# Patient Record
Sex: FEMALE | Race: WHITE | NOT HISPANIC OR LATINO
[De-identification: names, ages, dates, MRNs, and addresses within clinical notes are randomized per-mention and may not be internally consistent; named-entity substitution may affect disease eponyms.]

---

## 2018-08-23 ENCOUNTER — RESULT REVIEW (OUTPATIENT)
Age: 63
End: 2018-08-23

## 2018-12-31 ENCOUNTER — APPOINTMENT (OUTPATIENT)
Dept: FAMILY MEDICINE | Facility: CLINIC | Age: 63
End: 2018-12-31
Payer: COMMERCIAL

## 2018-12-31 DIAGNOSIS — Z23 ENCOUNTER FOR IMMUNIZATION: ICD-10-CM

## 2018-12-31 PROCEDURE — 90715 TDAP VACCINE 7 YRS/> IM: CPT

## 2018-12-31 PROCEDURE — 90471 IMMUNIZATION ADMIN: CPT

## 2019-12-05 ENCOUNTER — TRANSCRIPTION ENCOUNTER (OUTPATIENT)
Age: 64
End: 2019-12-05

## 2020-01-29 ENCOUNTER — RESULT REVIEW (OUTPATIENT)
Age: 65
End: 2020-01-29

## 2021-03-08 ENCOUNTER — RESULT REVIEW (OUTPATIENT)
Age: 66
End: 2021-03-08

## 2022-04-01 ENCOUNTER — RESULT REVIEW (OUTPATIENT)
Age: 67
End: 2022-04-01

## 2022-04-11 ENCOUNTER — APPOINTMENT (OUTPATIENT)
Dept: INTERNAL MEDICINE | Facility: CLINIC | Age: 67
End: 2022-04-11
Payer: COMMERCIAL

## 2022-04-11 VITALS
SYSTOLIC BLOOD PRESSURE: 140 MMHG | HEIGHT: 63 IN | HEART RATE: 109 BPM | BODY MASS INDEX: 29.23 KG/M2 | WEIGHT: 165 LBS | DIASTOLIC BLOOD PRESSURE: 80 MMHG | OXYGEN SATURATION: 99 % | TEMPERATURE: 96.5 F

## 2022-04-11 PROCEDURE — 99386 PREV VISIT NEW AGE 40-64: CPT | Mod: 25

## 2022-04-11 PROCEDURE — 36415 COLL VENOUS BLD VENIPUNCTURE: CPT

## 2022-04-11 NOTE — HISTORY OF PRESENT ILLNESS
[FreeTextEntry1] : Routine physical exam. [de-identified] : 66-year-old female for routine exam. Patient recently had a GYN exam was told of elevated blood pressure of 170 systolic. Patient has no prior history of hypertension feels absolutely well. There is a family history of hypertension. Past medical history essentially negative. Past surgery trigger finger release. Medications none. Social history nonsmoker nondrinker. Her last mammogram was in February. She had a colonoscopy last October. She feels absolutely well. Current blood pressure 150/90 on the left 140/80 on the right. She is 5 feet 3 weight 165. She has lost 4 pounds over the past several days. She is monitoring her diet carefully.

## 2022-04-11 NOTE — PLAN
[FreeTextEntry1] : Patient to call for lab results. Patient is advised to lose 10 pounds and to return in 2 months.

## 2022-04-11 NOTE — ASSESSMENT
[FreeTextEntry1] : Patient with mild hypertension. Patient is advised to try lose 10 pounds. She would like to try without medication at this time. Will follow blood pressure in 2 months. Routine labs have been drawn.

## 2022-04-14 LAB
25(OH)D3 SERPL-MCNC: 41.1 NG/ML
ALBUMIN SERPL ELPH-MCNC: 4.4 G/DL
ALP BLD-CCNC: 87 U/L
ALT SERPL-CCNC: 24 U/L
ANION GAP SERPL CALC-SCNC: 14 MMOL/L
AST SERPL-CCNC: 26 U/L
BASOPHILS # BLD AUTO: 0.05 K/UL
BASOPHILS NFR BLD AUTO: 0.9 %
BILIRUB SERPL-MCNC: 0.2 MG/DL
BUN SERPL-MCNC: 15 MG/DL
CALCIUM SERPL-MCNC: 9.7 MG/DL
CHLORIDE SERPL-SCNC: 107 MMOL/L
CHOLEST SERPL-MCNC: 224 MG/DL
CO2 SERPL-SCNC: 21 MMOL/L
CREAT SERPL-MCNC: 0.96 MG/DL
EGFR: 66 ML/MIN/1.73M2
EOSINOPHIL # BLD AUTO: 0.19 K/UL
EOSINOPHIL NFR BLD AUTO: 3.4 %
ESTIMATED AVERAGE GLUCOSE: 128 MG/DL
GLUCOSE SERPL-MCNC: 115 MG/DL
HBA1C MFR BLD HPLC: 6.1 %
HCT VFR BLD CALC: 42.5 %
HDLC SERPL-MCNC: 56 MG/DL
HGB BLD-MCNC: 13.4 G/DL
IMM GRANULOCYTES NFR BLD AUTO: 0.2 %
LDLC SERPL CALC-MCNC: 152 MG/DL
LYMPHOCYTES # BLD AUTO: 1.43 K/UL
LYMPHOCYTES NFR BLD AUTO: 25.7 %
MAN DIFF?: NORMAL
MCHC RBC-ENTMCNC: 31.5 GM/DL
MCHC RBC-ENTMCNC: 32.1 PG
MCV RBC AUTO: 101.9 FL
MONOCYTES # BLD AUTO: 0.62 K/UL
MONOCYTES NFR BLD AUTO: 11.2 %
NEUTROPHILS # BLD AUTO: 3.26 K/UL
NEUTROPHILS NFR BLD AUTO: 58.6 %
NONHDLC SERPL-MCNC: 168 MG/DL
PLATELET # BLD AUTO: 347 K/UL
POTASSIUM SERPL-SCNC: 4.6 MMOL/L
PROT SERPL-MCNC: 7.5 G/DL
RBC # BLD: 4.17 M/UL
RBC # FLD: 14.7 %
SODIUM SERPL-SCNC: 142 MMOL/L
TRIGL SERPL-MCNC: 81 MG/DL
TSH SERPL-ACNC: 2.58 UIU/ML
WBC # FLD AUTO: 5.56 K/UL

## 2022-07-13 DIAGNOSIS — U07.1 COVID-19: ICD-10-CM

## 2022-07-25 ENCOUNTER — APPOINTMENT (OUTPATIENT)
Dept: INTERNAL MEDICINE | Facility: CLINIC | Age: 67
End: 2022-07-25

## 2022-07-25 VITALS
WEIGHT: 155 LBS | TEMPERATURE: 96.6 F | SYSTOLIC BLOOD PRESSURE: 110 MMHG | OXYGEN SATURATION: 99 % | BODY MASS INDEX: 27.46 KG/M2 | DIASTOLIC BLOOD PRESSURE: 70 MMHG | HEART RATE: 106 BPM | HEIGHT: 63 IN

## 2022-07-25 DIAGNOSIS — I10 ESSENTIAL (PRIMARY) HYPERTENSION: ICD-10-CM

## 2022-07-25 PROCEDURE — 99214 OFFICE O/P EST MOD 30 MIN: CPT | Mod: 25

## 2022-07-25 PROCEDURE — 36415 COLL VENOUS BLD VENIPUNCTURE: CPT

## 2022-07-25 NOTE — HISTORY OF PRESENT ILLNESS
[FreeTextEntry1] : Followup blood pressure and hyperlipidemia. [de-identified] : Patient is now on atorvastatin 10 mg daily. She has lost 10 pounds and now weighs 155. She is not following her blood pressure. She feels well. Current blood pressure 120/75.

## 2022-07-25 NOTE — ASSESSMENT
[FreeTextEntry1] : Patient does not require blood pressure medication. We'll check cholesterol on atorvastatin 10 mg daily for the past 3 months.

## 2022-07-28 LAB
ALBUMIN SERPL ELPH-MCNC: 4.4 G/DL
ALP BLD-CCNC: 85 U/L
ALT SERPL-CCNC: 25 U/L
ANION GAP SERPL CALC-SCNC: 15 MMOL/L
AST SERPL-CCNC: 28 U/L
BILIRUB SERPL-MCNC: 0.3 MG/DL
BUN SERPL-MCNC: 19 MG/DL
CALCIUM SERPL-MCNC: 9.7 MG/DL
CHLORIDE SERPL-SCNC: 106 MMOL/L
CHOLEST SERPL-MCNC: 177 MG/DL
CO2 SERPL-SCNC: 23 MMOL/L
CREAT SERPL-MCNC: 1.04 MG/DL
EGFR: 60 ML/MIN/1.73M2
GLUCOSE SERPL-MCNC: 102 MG/DL
HDLC SERPL-MCNC: 49 MG/DL
LDLC SERPL CALC-MCNC: 106 MG/DL
NONHDLC SERPL-MCNC: 128 MG/DL
POTASSIUM SERPL-SCNC: 4.7 MMOL/L
PROT SERPL-MCNC: 7.4 G/DL
SODIUM SERPL-SCNC: 143 MMOL/L
TRIGL SERPL-MCNC: 112 MG/DL

## 2022-08-05 ENCOUNTER — TRANSCRIPTION ENCOUNTER (OUTPATIENT)
Age: 67
End: 2022-08-05

## 2023-04-11 ENCOUNTER — RX RENEWAL (OUTPATIENT)
Age: 68
End: 2023-04-11

## 2023-05-03 ENCOUNTER — NON-APPOINTMENT (OUTPATIENT)
Age: 68
End: 2023-05-03

## 2023-07-21 ENCOUNTER — APPOINTMENT (OUTPATIENT)
Dept: INTERNAL MEDICINE | Facility: CLINIC | Age: 68
End: 2023-07-21
Payer: MEDICARE

## 2023-07-21 VITALS
SYSTOLIC BLOOD PRESSURE: 128 MMHG | HEART RATE: 84 BPM | TEMPERATURE: 98.1 F | DIASTOLIC BLOOD PRESSURE: 74 MMHG | BODY MASS INDEX: 28 KG/M2 | WEIGHT: 158 LBS | RESPIRATION RATE: 15 BRPM | HEIGHT: 63 IN | OXYGEN SATURATION: 98 %

## 2023-07-21 DIAGNOSIS — Z83.3 FAMILY HISTORY OF DIABETES MELLITUS: ICD-10-CM

## 2023-07-21 DIAGNOSIS — Z82.5 FAMILY HISTORY OF ASTHMA AND OTHER CHRONIC LOWER RESPIRATORY DISEASES: ICD-10-CM

## 2023-07-21 DIAGNOSIS — R71.8 OTHER ABNORMALITY OF RED BLOOD CELLS: ICD-10-CM

## 2023-07-21 DIAGNOSIS — Z85.72 PERSONAL HISTORY OF NON-HODGKIN LYMPHOMAS: ICD-10-CM

## 2023-07-21 DIAGNOSIS — Z00.00 ENCOUNTER FOR GENERAL ADULT MEDICAL EXAMINATION W/OUT ABNORMAL FINDINGS: ICD-10-CM

## 2023-07-21 DIAGNOSIS — E28.319 ASYMPTOMATIC PREMATURE MENOPAUSE: ICD-10-CM

## 2023-07-21 DIAGNOSIS — R73.03 PREDIABETES.: ICD-10-CM

## 2023-07-21 DIAGNOSIS — Z78.0 ASYMPTOMATIC MENOPAUSAL STATE: ICD-10-CM

## 2023-07-21 DIAGNOSIS — E78.5 HYPERLIPIDEMIA, UNSPECIFIED: ICD-10-CM

## 2023-07-21 PROCEDURE — G0439: CPT

## 2023-07-21 PROCEDURE — 36415 COLL VENOUS BLD VENIPUNCTURE: CPT

## 2023-07-21 RX ORDER — NIRMATRELVIR AND RITONAVIR 300-100 MG
20 X 150 MG & KIT ORAL
Qty: 30 | Refills: 0 | Status: COMPLETED | COMMUNITY
Start: 2022-07-13 | End: 2023-07-21

## 2023-07-21 NOTE — PHYSICAL EXAM
[Normal Sclera/Conjunctiva] : normal sclera/conjunctiva [Normal Outer Ear/Nose] : the outer ears and nose were normal in appearance [No Lymphadenopathy] : no lymphadenopathy [Thyroid Normal, No Nodules] : the thyroid was normal and there were no nodules present [No Edema] : there was no peripheral edema [Normal] : affect was normal and insight and judgment were intact

## 2023-07-21 NOTE — HISTORY OF PRESENT ILLNESS
[de-identified] : Patient hx of NHL, HLD presents today to establish care and for  wellness \par Feels well without complaints today\par BP elevated in GYN office\par \par onc: follows with Dr. Sal yearly

## 2023-07-21 NOTE — PLAN
[FreeTextEntry1] : due for bone density\par Will get vaccine records from [facility] - had shingles and pneumonia there\par up to date with mammo, pap and colonoscopy

## 2023-07-21 NOTE — HEALTH RISK ASSESSMENT
[Very Good] : ~his/her~  mood as very good [1 or 2 (0 pts)] : 1 or 2 (0 points) [Never (0 pts)] : Never (0 points) [No falls in past year] : Patient reported no falls in the past year [0] : 2) Feeling down, depressed, or hopeless: Not at all (0) [Patient reported mammogram was normal] : Patient reported mammogram was normal [Patient reported PAP Smear was normal] : Patient reported PAP Smear was normal [Patient reported colonoscopy was normal] : Patient reported colonoscopy was normal [HIV test declined] : HIV test declined [Hepatitis C test declined] : Hepatitis C test declined [With Family] : lives with family [Employed] : employed [Retired] : retired [] :  [Sexually Active] : sexually active [Feels Safe at Home] : Feels safe at home [Fully functional (bathing, dressing, toileting, transferring, walking, feeding)] : Fully functional (bathing, dressing, toileting, transferring, walking, feeding) [Fully functional (using the telephone, shopping, preparing meals, housekeeping, doing laundry, using] : Fully functional and needs no help or supervision to perform IADLs (using the telephone, shopping, preparing meals, housekeeping, doing laundry, using transportation, managing medications and managing finances) [Smoke Detector] : smoke detector [Carbon Monoxide Detector] : carbon monoxide detector [Sunscreen] : uses sunscreen [Never] : Never [Monthly or less (1 pt)] : Monthly or less (1 point) [PHQ-2 Negative - No further assessment needed] : PHQ-2 Negative - No further assessment needed [de-identified] : OCCASIONAL  [[id number]] : 0 [Change in mental status noted] : No change in mental status noted [Language] : denies difficulty with language [Behavior] : denies difficulty with behavior [Learning/Retaining New Information] : denies difficulty learning/retaining new information [Handling Complex Tasks] : denies difficulty handling complex tasks [Reasoning] : denies difficulty with reasoning [Spatial Ability and Orientation] : denies difficulty with spatial ability and orientation [High Risk Behavior] : no high risk behavior [Seat Belt] : does not use seat belt [TB Exposure] : is not being exposed to tuberculosis [MammogramDate] : 03/23 [PapSmearDate] : 05/23 [ColonoscopyDate] : 01/21 [ColonoscopyComments] : NEXT COLON 2031 Dr. Muñiz

## 2023-07-25 ENCOUNTER — TRANSCRIPTION ENCOUNTER (OUTPATIENT)
Age: 68
End: 2023-07-25

## 2023-07-25 LAB
ALBUMIN SERPL ELPH-MCNC: 4.6 G/DL
ALP BLD-CCNC: 86 U/L
ALT SERPL-CCNC: 23 U/L
ANION GAP SERPL CALC-SCNC: 13 MMOL/L
AST SERPL-CCNC: 30 U/L
BILIRUB SERPL-MCNC: 0.3 MG/DL
BUN SERPL-MCNC: 20 MG/DL
CALCIUM SERPL-MCNC: 10.2 MG/DL
CHLORIDE SERPL-SCNC: 101 MMOL/L
CHOLEST SERPL-MCNC: 181 MG/DL
CO2 SERPL-SCNC: 25 MMOL/L
CREAT SERPL-MCNC: 1.01 MG/DL
EGFR: 61 ML/MIN/1.73M2
ESTIMATED AVERAGE GLUCOSE: 120 MG/DL
FOLATE SERPL-MCNC: >20 NG/ML
GLUCOSE SERPL-MCNC: 89 MG/DL
HBA1C MFR BLD HPLC: 5.8 %
HDLC SERPL-MCNC: 59 MG/DL
LDLC SERPL CALC-MCNC: 99 MG/DL
NONHDLC SERPL-MCNC: 122 MG/DL
POTASSIUM SERPL-SCNC: 4.5 MMOL/L
PROT SERPL-MCNC: 7.9 G/DL
SODIUM SERPL-SCNC: 138 MMOL/L
TRIGL SERPL-MCNC: 133 MG/DL
VIT B12 SERPL-MCNC: 950 PG/ML

## 2023-07-28 ENCOUNTER — TRANSCRIPTION ENCOUNTER (OUTPATIENT)
Age: 68
End: 2023-07-28

## 2023-08-09 ENCOUNTER — RESULT REVIEW (OUTPATIENT)
Age: 68
End: 2023-08-09

## 2023-08-19 ENCOUNTER — TRANSCRIPTION ENCOUNTER (OUTPATIENT)
Age: 68
End: 2023-08-19

## 2024-04-15 RX ORDER — ATORVASTATIN CALCIUM 10 MG/1
10 TABLET, FILM COATED ORAL
Qty: 90 | Refills: 1 | Status: ACTIVE | COMMUNITY
Start: 2022-04-14 | End: 1900-01-01

## 2024-06-28 ENCOUNTER — NON-APPOINTMENT (OUTPATIENT)
Age: 69
End: 2024-06-28

## 2024-07-01 ENCOUNTER — NON-APPOINTMENT (OUTPATIENT)
Age: 69
End: 2024-07-01

## 2024-07-03 ENCOUNTER — APPOINTMENT (OUTPATIENT)
Dept: BREAST CENTER | Facility: CLINIC | Age: 69
End: 2024-07-03
Payer: MEDICARE

## 2024-07-03 VITALS
DIASTOLIC BLOOD PRESSURE: 79 MMHG | SYSTOLIC BLOOD PRESSURE: 149 MMHG | WEIGHT: 160 LBS | HEIGHT: 63 IN | BODY MASS INDEX: 28.35 KG/M2 | HEART RATE: 75 BPM

## 2024-07-03 DIAGNOSIS — Z86.39 PERSONAL HISTORY OF OTHER ENDOCRINE, NUTRITIONAL AND METABOLIC DISEASE: ICD-10-CM

## 2024-07-03 DIAGNOSIS — Z86.79 PERSONAL HISTORY OF OTHER DISEASES OF THE CIRCULATORY SYSTEM: ICD-10-CM

## 2024-07-03 DIAGNOSIS — Z17.0 MALIGNANT NEOPLASM OF OVERLAPPING SITES OF RIGHT FEMALE BREAST: ICD-10-CM

## 2024-07-03 DIAGNOSIS — Z78.9 OTHER SPECIFIED HEALTH STATUS: ICD-10-CM

## 2024-07-03 DIAGNOSIS — Z83.3 FAMILY HISTORY OF DIABETES MELLITUS: ICD-10-CM

## 2024-07-03 DIAGNOSIS — Z80.0 FAMILY HISTORY OF MALIGNANT NEOPLASM OF DIGESTIVE ORGANS: ICD-10-CM

## 2024-07-03 DIAGNOSIS — Z92.21 PERSONAL HISTORY OF ANTINEOPLASTIC CHEMOTHERAPY: ICD-10-CM

## 2024-07-03 DIAGNOSIS — C50.811 MALIGNANT NEOPLASM OF OVERLAPPING SITES OF RIGHT FEMALE BREAST: ICD-10-CM

## 2024-07-03 DIAGNOSIS — Z92.3 PERSONAL HISTORY OF IRRADIATION: ICD-10-CM

## 2024-07-03 DIAGNOSIS — Z82.49 FAMILY HISTORY OF ISCHEMIC HEART DISEASE AND OTHER DISEASES OF THE CIRCULATORY SYSTEM: ICD-10-CM

## 2024-07-03 DIAGNOSIS — Z83.79 FAMILY HISTORY OF OTHER DISEASES OF THE DIGESTIVE SYSTEM: ICD-10-CM

## 2024-07-03 DIAGNOSIS — Z80.3 FAMILY HISTORY OF MALIGNANT NEOPLASM OF BREAST: ICD-10-CM

## 2024-07-03 PROCEDURE — 99205 OFFICE O/P NEW HI 60 MIN: CPT | Mod: 25

## 2024-07-03 PROCEDURE — 93702 BIS XTRACELL FLUID ANALYSIS: CPT

## 2024-07-03 RX ORDER — MULTIVITAMIN
TABLET ORAL
Refills: 0 | Status: ACTIVE | COMMUNITY

## 2024-07-08 ENCOUNTER — RESULT REVIEW (OUTPATIENT)
Age: 69
End: 2024-07-08

## 2024-07-09 ENCOUNTER — NON-APPOINTMENT (OUTPATIENT)
Age: 69
End: 2024-07-09

## 2024-07-10 ENCOUNTER — APPOINTMENT (OUTPATIENT)
Dept: HEMATOLOGY ONCOLOGY | Facility: CLINIC | Age: 69
End: 2024-07-10

## 2024-07-10 DIAGNOSIS — R92.8 OTHER ABNORMAL AND INCONCLUSIVE FINDINGS ON DIAGNOSTIC IMAGING OF BREAST: ICD-10-CM

## 2024-07-16 ENCOUNTER — NON-APPOINTMENT (OUTPATIENT)
Age: 69
End: 2024-07-16

## 2024-07-17 ENCOUNTER — NON-APPOINTMENT (OUTPATIENT)
Age: 69
End: 2024-07-17

## 2024-07-19 DIAGNOSIS — F41.9 ANXIETY DISORDER, UNSPECIFIED: ICD-10-CM

## 2024-07-19 RX ORDER — ALPRAZOLAM 0.25 MG/1
0.25 TABLET ORAL
Qty: 2 | Refills: 0 | Status: ACTIVE | COMMUNITY
Start: 2024-07-19 | End: 1900-01-01

## 2024-07-22 ENCOUNTER — APPOINTMENT (OUTPATIENT)
Dept: INTERNAL MEDICINE | Facility: CLINIC | Age: 69
End: 2024-07-22

## 2024-07-22 ENCOUNTER — RESULT REVIEW (OUTPATIENT)
Age: 69
End: 2024-07-22

## 2024-07-23 ENCOUNTER — APPOINTMENT (OUTPATIENT)
Dept: INTERNAL MEDICINE | Facility: CLINIC | Age: 69
End: 2024-07-23
Payer: MEDICARE

## 2024-07-23 VITALS
HEIGHT: 63 IN | WEIGHT: 160 LBS | HEART RATE: 77 BPM | BODY MASS INDEX: 28.35 KG/M2 | SYSTOLIC BLOOD PRESSURE: 130 MMHG | OXYGEN SATURATION: 98 % | DIASTOLIC BLOOD PRESSURE: 60 MMHG

## 2024-07-23 DIAGNOSIS — Z17.0 MALIGNANT NEOPLASM OF OVERLAPPING SITES OF RIGHT FEMALE BREAST: ICD-10-CM

## 2024-07-23 DIAGNOSIS — E55.9 VITAMIN D DEFICIENCY, UNSPECIFIED: ICD-10-CM

## 2024-07-23 DIAGNOSIS — C50.811 MALIGNANT NEOPLASM OF OVERLAPPING SITES OF RIGHT FEMALE BREAST: ICD-10-CM

## 2024-07-23 DIAGNOSIS — Z00.00 ENCOUNTER FOR GENERAL ADULT MEDICAL EXAMINATION W/OUT ABNORMAL FINDINGS: ICD-10-CM

## 2024-07-23 DIAGNOSIS — E78.5 HYPERLIPIDEMIA, UNSPECIFIED: ICD-10-CM

## 2024-07-23 DIAGNOSIS — R73.03 PREDIABETES.: ICD-10-CM

## 2024-07-23 DIAGNOSIS — R71.8 OTHER ABNORMALITY OF RED BLOOD CELLS: ICD-10-CM

## 2024-07-23 PROCEDURE — 93000 ELECTROCARDIOGRAM COMPLETE: CPT

## 2024-07-23 PROCEDURE — 99214 OFFICE O/P EST MOD 30 MIN: CPT | Mod: 25

## 2024-07-23 PROCEDURE — G0439: CPT

## 2024-07-23 PROCEDURE — 36415 COLL VENOUS BLD VENIPUNCTURE: CPT

## 2024-07-23 RX ORDER — ATORVASTATIN CALCIUM 10 MG/1
10 TABLET, FILM COATED ORAL
Refills: 0 | Status: DISCONTINUED | COMMUNITY
End: 2024-07-23

## 2024-07-24 NOTE — HEALTH RISK ASSESSMENT
[Very Good] : ~his/her~  mood as very good [Monthly or less (1 pt)] : Monthly or less (1 point) [1 or 2 (0 pts)] : 1 or 2 (0 points) [Never (0 pts)] : Never (0 points) [No] : In the past 12 months have you used drugs other than those required for medical reasons? No [No falls in past year] : Patient reported no falls in the past year [0] : 2) Feeling down, depressed, or hopeless: Not at all (0) [PHQ-2 Negative - No further assessment needed] : PHQ-2 Negative - No further assessment needed [Never] : Never [NO] : No [Patient reported mammogram was normal] : Patient reported mammogram was normal [Patient reported PAP Smear was normal] : Patient reported PAP Smear was normal [Patient reported colonoscopy was normal] : Patient reported colonoscopy was normal [HIV test declined] : HIV test declined [Hepatitis C test declined] : Hepatitis C test declined [None] : None [With Family] : lives with family [Retired] : retired [] :  [# Of Children ___] : has [unfilled] children [Sexually Active] : sexually active [Feels Safe at Home] : Feels safe at home [Fully functional (bathing, dressing, toileting, transferring, walking, feeding)] : Fully functional (bathing, dressing, toileting, transferring, walking, feeding) [Fully functional (using the telephone, shopping, preparing meals, housekeeping, doing laundry, using] : Fully functional and needs no help or supervision to perform IADLs (using the telephone, shopping, preparing meals, housekeeping, doing laundry, using transportation, managing medications and managing finances) [Reports changes in hearing] : Reports changes in hearing [Smoke Detector] : smoke detector [Carbon Monoxide Detector] : carbon monoxide detector [Sunscreen] : uses sunscreen [Patient reported bone density results were abnormal] : Patient reported bone density results were abnormal [de-identified] : OCCASIONAL  [de-identified] : Regular [de-identified] : jayne buck [[id number]] : 0 [Change in mental status noted] : No change in mental status noted [Language] : denies difficulty with language [Behavior] : denies difficulty with behavior [Learning/Retaining New Information] : denies difficulty learning/retaining new information [Handling Complex Tasks] : denies difficulty handling complex tasks [Reasoning] : denies difficulty with reasoning [Spatial Ability and Orientation] : denies difficulty with spatial ability and orientation [High Risk Behavior] : no high risk behavior [Reports changes in vision] : Reports no changes in vision [Reports changes in dental health] : Reports no changes in dental health [Seat Belt] : does not use seat belt [TB Exposure] : is not being exposed to tuberculosis [MammogramDate] : 06/21/24 [PapSmearDate] : 05/23 [BoneDensityDate] : 08/09/23 [BoneDensityComments] : osteopenia [ColonoscopyDate] : 01/21 [ColonoscopyComments] : NEXT COLON 2031 Dr. Muñiz [de-identified] : RT ear hearing loss [de-identified] : last exam 02/24 [de-identified] : Last exam 03/24

## 2024-07-24 NOTE — HISTORY OF PRESENT ILLNESS
[Preoperative Visit] : for a medical evaluation prior to surgery [Scheduled Procedure ___] : a [unfilled] [Date of Surgery ___] : on [unfilled] [Surgeon Name ___] : surgeon: [unfilled] [Prior Anesthesia] : Prior anesthesia [Good] : Good [Diabetes] : no diabetes [Cardiovascular Disease] : no cardiovascular disease [Pulmonary Disease] : no pulmonary disease [Anti-Platelet Agents] : no anti-platelet agents [Nicotine Dependence] : no nicotine dependence [Alcohol Use] : no  alcohol use [Renal Disease] : no renal disease [GI Disease] : no gastrointestinal disease [Sleep Apnea] : no sleep apnea [Thromboembolic Problems] : no thromboembolic problems [Frequent use of NSAIDs] : no use of NSAIDs [Transfusion Reaction] : no transfusion reaction [Impaired Immunity] : no impaired immunity [Steroid Use in Last 6 Months] : no steroid use in the last six months [Frequent Aspirin Use] : no frequent aspirin use [Prev Anesthesia Reaction] : no previous anesthesia reaction [Anesthesia Reaction] : no anesthesia reaction [Sudden Death] : no sudden death [Clotting Disorder] : no clotting disorder [Bleeding Disorder] : no bleeding disorder [de-identified] : Dr. 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   Feels well without complaints today   Prior surgery: port placement  No reactions to anesthesia  Exercise: 1 hour of cielo and walking, can get HR up to 130s

## 2024-07-24 NOTE — HISTORY OF PRESENT ILLNESS
[Preoperative Visit] : for a medical evaluation prior to surgery [Scheduled Procedure ___] : a [unfilled] [Date of Surgery ___] : on [unfilled] [Surgeon Name ___] : surgeon: [unfilled] [Prior Anesthesia] : Prior anesthesia [Good] : Good [Diabetes] : no diabetes [Cardiovascular Disease] : no cardiovascular disease [Pulmonary Disease] : no pulmonary disease [Anti-Platelet Agents] : no anti-platelet agents [Nicotine Dependence] : no nicotine dependence [Alcohol Use] : no  alcohol use [Renal Disease] : no renal disease [GI Disease] : no gastrointestinal disease [Sleep Apnea] : no sleep apnea [Thromboembolic Problems] : no thromboembolic problems [Frequent use of NSAIDs] : no use of NSAIDs [Transfusion Reaction] : no transfusion reaction [Impaired Immunity] : no impaired immunity [Steroid Use in Last 6 Months] : no steroid use in the last six months [Frequent Aspirin Use] : no frequent aspirin use [Prev Anesthesia Reaction] : no previous anesthesia reaction [Anesthesia Reaction] : no anesthesia reaction [Sudden Death] : no sudden death [Clotting Disorder] : no clotting disorder [Bleeding Disorder] : no bleeding disorder [de-identified] : Dr. 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   Feels well without complaints today   Prior surgery: port placement  No reactions to anesthesia  Exercise: 1 hour of cielo and walking, can get HR up to 130s

## 2024-07-24 NOTE — HEALTH RISK ASSESSMENT
[Very Good] : ~his/her~  mood as very good [Monthly or less (1 pt)] : Monthly or less (1 point) [1 or 2 (0 pts)] : 1 or 2 (0 points) [Never (0 pts)] : Never (0 points) [No] : In the past 12 months have you used drugs other than those required for medical reasons? No [No falls in past year] : Patient reported no falls in the past year [0] : 2) Feeling down, depressed, or hopeless: Not at all (0) [PHQ-2 Negative - No further assessment needed] : PHQ-2 Negative - No further assessment needed [Never] : Never [NO] : No [Patient reported mammogram was normal] : Patient reported mammogram was normal [Patient reported PAP Smear was normal] : Patient reported PAP Smear was normal [Patient reported colonoscopy was normal] : Patient reported colonoscopy was normal [HIV test declined] : HIV test declined [Hepatitis C test declined] : Hepatitis C test declined [None] : None [With Family] : lives with family [Retired] : retired [] :  [# Of Children ___] : has [unfilled] children [Sexually Active] : sexually active [Feels Safe at Home] : Feels safe at home [Fully functional (bathing, dressing, toileting, transferring, walking, feeding)] : Fully functional (bathing, dressing, toileting, transferring, walking, feeding) [Fully functional (using the telephone, shopping, preparing meals, housekeeping, doing laundry, using] : Fully functional and needs no help or supervision to perform IADLs (using the telephone, shopping, preparing meals, housekeeping, doing laundry, using transportation, managing medications and managing finances) [Reports changes in hearing] : Reports changes in hearing [Smoke Detector] : smoke detector [Carbon Monoxide Detector] : carbon monoxide detector [Sunscreen] : uses sunscreen [Patient reported bone density results were abnormal] : Patient reported bone density results were abnormal [de-identified] : OCCASIONAL  [de-identified] : jayne buck [de-identified] : Regular [[id number]] : 0 [Change in mental status noted] : No change in mental status noted [Language] : denies difficulty with language [Behavior] : denies difficulty with behavior [Handling Complex Tasks] : denies difficulty handling complex tasks [Learning/Retaining New Information] : denies difficulty learning/retaining new information [Reasoning] : denies difficulty with reasoning [Spatial Ability and Orientation] : denies difficulty with spatial ability and orientation [High Risk Behavior] : no high risk behavior [Reports changes in vision] : Reports no changes in vision [Reports changes in dental health] : Reports no changes in dental health [Seat Belt] : does not use seat belt [TB Exposure] : is not being exposed to tuberculosis [MammogramDate] : 06/21/24 [PapSmearDate] : 05/23 [BoneDensityDate] : 08/09/23 [BoneDensityComments] : osteopenia [ColonoscopyDate] : 01/21 [ColonoscopyComments] : NEXT COLON 2031 Dr. Muñiz [de-identified] : RT ear hearing loss [de-identified] : last exam 02/24 [de-identified] : Last exam 03/24

## 2024-07-25 ENCOUNTER — TRANSCRIPTION ENCOUNTER (OUTPATIENT)
Age: 69
End: 2024-07-25

## 2024-07-25 LAB
25(OH)D3 SERPL-MCNC: 54 NG/ML
ALBUMIN SERPL ELPH-MCNC: 4.5 G/DL
ALP BLD-CCNC: 83 U/L
ALT SERPL-CCNC: 25 U/L
ANION GAP SERPL CALC-SCNC: 11 MMOL/L
APTT BLD: 30.2 SEC
AST SERPL-CCNC: 27 U/L
BASOPHILS # BLD AUTO: 0.05 K/UL
BASOPHILS NFR BLD AUTO: 0.8 %
BILIRUB SERPL-MCNC: 0.3 MG/DL
BUN SERPL-MCNC: 19 MG/DL
CALCIUM SERPL-MCNC: 10.2 MG/DL
CHLORIDE SERPL-SCNC: 107 MMOL/L
CHOLEST SERPL-MCNC: 192 MG/DL
CO2 SERPL-SCNC: 28 MMOL/L
CREAT SERPL-MCNC: 0.94 MG/DL
EGFR: 67 ML/MIN/1.73M2
EOSINOPHIL # BLD AUTO: 0.12 K/UL
EOSINOPHIL NFR BLD AUTO: 2 %
ESTIMATED AVERAGE GLUCOSE: 120 MG/DL
GLUCOSE SERPL-MCNC: 108 MG/DL
HBA1C MFR BLD HPLC: 5.8 %
HCT VFR BLD CALC: 41.4 %
HDLC SERPL-MCNC: 56 MG/DL
HGB BLD-MCNC: 13 G/DL
IMM GRANULOCYTES NFR BLD AUTO: 0.2 %
INR PPP: 0.86 RATIO
LDLC SERPL CALC-MCNC: 106 MG/DL
LYMPHOCYTES # BLD AUTO: 1.72 K/UL
LYMPHOCYTES NFR BLD AUTO: 29.1 %
MAN DIFF?: NORMAL
MCHC RBC-ENTMCNC: 31.4 GM/DL
MCHC RBC-ENTMCNC: 32.6 PG
MCV RBC AUTO: 103.8 FL
MONOCYTES # BLD AUTO: 0.64 K/UL
MONOCYTES NFR BLD AUTO: 10.8 %
NEUTROPHILS # BLD AUTO: 3.38 K/UL
NEUTROPHILS NFR BLD AUTO: 57.1 %
NONHDLC SERPL-MCNC: 136 MG/DL
PLATELET # BLD AUTO: 295 K/UL
POTASSIUM SERPL-SCNC: 4.9 MMOL/L
PROT SERPL-MCNC: 7.4 G/DL
PT BLD: 9.9 SEC
RBC # BLD: 3.99 M/UL
RBC # FLD: 15.4 %
SODIUM SERPL-SCNC: 146 MMOL/L
TRIGL SERPL-MCNC: 174 MG/DL
WBC # FLD AUTO: 5.92 K/UL

## 2024-08-02 ENCOUNTER — NON-APPOINTMENT (OUTPATIENT)
Age: 69
End: 2024-08-02

## 2024-08-04 ENCOUNTER — RESULT REVIEW (OUTPATIENT)
Age: 69
End: 2024-08-04

## 2024-08-08 ENCOUNTER — TRANSCRIPTION ENCOUNTER (OUTPATIENT)
Age: 69
End: 2024-08-08

## 2024-08-08 ENCOUNTER — RESULT REVIEW (OUTPATIENT)
Age: 69
End: 2024-08-08

## 2024-08-08 ENCOUNTER — APPOINTMENT (OUTPATIENT)
Dept: BREAST CENTER | Facility: HOSPITAL | Age: 69
End: 2024-08-08

## 2024-08-09 ENCOUNTER — NON-APPOINTMENT (OUTPATIENT)
Age: 69
End: 2024-08-09

## 2024-08-13 NOTE — REVIEW OF SYSTEMS
[Dry Eyes] : dryness of the eyes [Eyes Itch] : itching of the eyes [Loss Of Hearing] : hearing loss [Nosebleeds] : nosebleeds [Diarrhea] : diarrhea [Heartburn] : heartburn [Negative] : Heme/Lymph

## 2024-08-16 ENCOUNTER — APPOINTMENT (OUTPATIENT)
Dept: BREAST CENTER | Facility: CLINIC | Age: 69
End: 2024-08-16
Payer: MEDICARE

## 2024-08-16 VITALS
SYSTOLIC BLOOD PRESSURE: 124 MMHG | BODY MASS INDEX: 28.35 KG/M2 | DIASTOLIC BLOOD PRESSURE: 63 MMHG | HEIGHT: 63 IN | WEIGHT: 160 LBS | HEART RATE: 80 BPM

## 2024-08-16 DIAGNOSIS — Z17.0 MALIGNANT NEOPLASM OF OVERLAPPING SITES OF RIGHT FEMALE BREAST: ICD-10-CM

## 2024-08-16 DIAGNOSIS — D05.02 LOBULAR CARCINOMA IN SITU OF LEFT BREAST: ICD-10-CM

## 2024-08-16 DIAGNOSIS — C50.811 MALIGNANT NEOPLASM OF OVERLAPPING SITES OF RIGHT FEMALE BREAST: ICD-10-CM

## 2024-08-16 PROCEDURE — 99024 POSTOP FOLLOW-UP VISIT: CPT

## 2024-08-16 NOTE — HISTORY OF PRESENT ILLNESS
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 Her screening mammogram (2024, [facility]) showed scattered fibroglandular tissue and a 1.1 cm lobulated mass in the right breast at 6:00. A right diagnostic mammogram and targeted ultrasound recommended and done on 2024 ([place]). Right diagnostic confirmed a right 6:00 posterior lobulated mass. Ultrasound findings did not show a definitive sonographic mass correlating to mammo findings, therefore right stereotactic biopsy was recommended and done on 2024. Pathology of right 6:00 (cork-shaped clip) showed papillary carcinoma with microinvasion, intermediate nuclear grade, ER strongly positive in > 95%, ND strongly positive in >95%.   Berenice's previous history is positive for non-Hodgkins's lymphoma in  which was treated with chemotherapy (Dr Roberts) and mantle radiation therapy to the mediastinum (Dr Forrest Ashley,[facility]) . She is now followed by Dr Sal. Her family history is positive for breast carcinoma in 2 sisters at age 40 and 48.   She had MRI which bx was recommended right 1.2 cm area of linear nonmass enhancement is identified in the lower central mid depth breast, approx 4cm anterior to the site of bx carcinoma- MRI bx recc. 2024 MRI bx right lower central breast (hour glass marker) pathology confirmed DCIS, intermediate grade, ER/ND+.   She is now s/p right breast partial mastectomy 2024- final pathology confirmed right LIQ DCIS with microinvasion 1.7mm, 18mm and 0.45mm of microinvasion ER>95% strong positive, ND >95% strong and HEr2 negative and ki67 too few tumor cells 0/8SLN, right breast medial had 4mm single focus on random section, not at edges of what was sampled, ER/ND positive ?95% positive; skin with dermal lymphocytic infiltrate. Left breast superior had cLCIS <1mm focus.  She does SBE. She has not noticed a change in her breast or a breast lump. She has not noticed a change in her nipple or nipple area. She has not noticed a change in the skin of the breast. She is not experiencing nipple discharge. She is not experiencing breast pain. She has not noticed a lump or lymph node under the armpit.   BREAST CANCER RISK FACTORS Menarche: 15 Date of LMP:1998 Menopause: 43 (chemo induced) Grav:  3    Para: 2 Age at first live birth: 30 Nursed: yes Hysterectomy: no Oophorectomy: no OCP: yes 2-3y HRT: no Last pap/pelvic exam:  WNL Related family history: Sister BCA @ 40, sister BCA@48 Ashkenazi: no Mastery risk assessment: n/a Genetic testin BRCANext negative Bra size: 42D   Last mammogram:    2024 Right diagnostic (Bilat 24)       Location: [place] Report reviewed.                                 Images reviewed. Results: Birads 4A Scattered fibroglandular densities. Right 6:00 posterior lobulated mass. Rec: Right stereotactic biopsy.   Last ultrasound: 2024 Right targeted                                  Location: [place] Report reviewed.                                 Images reviewed. Results: Birads 4A  Right 6:00 N4 0.8 x 0.5 x 0.4 cm hypoechoic structure felt to possible correlate with mammography findings but structure difficult to replicate on real time scanning and was possible felt to be fat lobule. Rec: right stereotactic biopsy   Last MRI: 2024                                            Location: [place] Report reviewed.                                   Images Reviewed. Results: BIRADS 4A Right lower central posterior enhancing 1xm mass with associated tissue marker corresponding to bx carcinoma. There is surrounding Non mass enhancement, some of which may be postbx change, including linear nonmass enhancement extending up to approx 1cm anterior to mass. in addition, a 1.2 cm area of linear nonmass enhancement is identified in the lower cnetral mid depth breast, approx 4cm anterior to the site of bx carcinoma- MRI bx recc.  left negative

## 2024-08-16 NOTE — PHYSICAL EXAM
[Symmetrical] : symmetrical [de-identified] : healing incisions, swelling as expected, no collections

## 2024-08-16 NOTE — ASSESSMENT
[FreeTextEntry1] : doing well path reviewed copy given rec med/onc cs and rad/onc cs f/u 1 month She knows to call or return sooner should any concerns or questions arise.

## 2024-08-16 NOTE — PHYSICAL EXAM
[Symmetrical] : symmetrical [de-identified] : healing incisions, swelling as expected, no collections

## 2024-08-16 NOTE — HISTORY OF PRESENT ILLNESS
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 Her screening mammogram (2024, [facility]) showed scattered fibroglandular tissue and a 1.1 cm lobulated mass in the right breast at 6:00. A right diagnostic mammogram and targeted ultrasound recommended and done on 2024 ([place]). Right diagnostic confirmed a right 6:00 posterior lobulated mass. Ultrasound findings did not show a definitive sonographic mass correlating to mammo findings, therefore right stereotactic biopsy was recommended and done on 2024. Pathology of right 6:00 (cork-shaped clip) showed papillary carcinoma with microinvasion, intermediate nuclear grade, ER strongly positive in > 95%, DC strongly positive in >95%.   Berenice's previous history is positive for non-Hodgkins's lymphoma in  which was treated with chemotherapy (Dr Roberts) and mantle radiation therapy to the mediastinum (Dr Forrest Ashley,[facility]) . She is now followed by Dr Sal. Her family history is positive for breast carcinoma in 2 sisters at age 40 and 48.   She had MRI which bx was recommended right 1.2 cm area of linear nonmass enhancement is identified in the lower central mid depth breast, approx 4cm anterior to the site of bx carcinoma- MRI bx recc. 2024 MRI bx right lower central breast (hour glass marker) pathology confirmed DCIS, intermediate grade, ER/DC+.   She is now s/p right breast partial mastectomy 2024- final pathology confirmed right LIQ DCIS with microinvasion 1.7mm, 18mm and 0.45mm of microinvasion ER>95% strong positive, DC >95% strong and HEr2 negative and ki67 too few tumor cells 0/8SLN, right breast medial had 4mm single focus on random section, not at edges of what was sampled, ER/DC positive ?95% positive; skin with dermal lymphocytic infiltrate. Left breast superior had cLCIS <1mm focus.  She does SBE. She has not noticed a change in her breast or a breast lump. She has not noticed a change in her nipple or nipple area. She has not noticed a change in the skin of the breast. She is not experiencing nipple discharge. She is not experiencing breast pain. She has not noticed a lump or lymph node under the armpit.   BREAST CANCER RISK FACTORS Menarche: 15 Date of LMP:1998 Menopause: 43 (chemo induced) Grav:  3    Para: 2 Age at first live birth: 30 Nursed: yes Hysterectomy: no Oophorectomy: no OCP: yes 2-3y HRT: no Last pap/pelvic exam:  WNL Related family history: Sister BCA @ 40, sister BCA@48 Ashkenazi: no Mastery risk assessment: n/a Genetic testin BRCANext negative Bra size: 42D   Last mammogram:    2024 Right diagnostic (Bilat 24)       Location: [place] Report reviewed.                                 Images reviewed. Results: Birads 4A Scattered fibroglandular densities. Right 6:00 posterior lobulated mass. Rec: Right stereotactic biopsy.   Last ultrasound: 2024 Right targeted                                  Location: [place] Report reviewed.                                 Images reviewed. Results: Birads 4A  Right 6:00 N4 0.8 x 0.5 x 0.4 cm hypoechoic structure felt to possible correlate with mammography findings but structure difficult to replicate on real time scanning and was possible felt to be fat lobule. Rec: right stereotactic biopsy   Last MRI: 2024                                            Location: [place] Report reviewed.                                   Images Reviewed. Results: BIRADS 4A Right lower central posterior enhancing 1xm mass with associated tissue marker corresponding to bx carcinoma. There is surrounding Non mass enhancement, some of which may be postbx change, including linear nonmass enhancement extending up to approx 1cm anterior to mass. in addition, a 1.2 cm area of linear nonmass enhancement is identified in the lower cnetral mid depth breast, approx 4cm anterior to the site of bx carcinoma- MRI bx recc.  left negative

## 2024-08-30 ENCOUNTER — APPOINTMENT (OUTPATIENT)
Dept: RADIATION ONCOLOGY | Facility: CLINIC | Age: 69
End: 2024-08-30
Payer: MEDICARE

## 2024-08-30 VITALS
WEIGHT: 161 LBS | DIASTOLIC BLOOD PRESSURE: 78 MMHG | HEIGHT: 63 IN | TEMPERATURE: 98 F | HEART RATE: 84 BPM | RESPIRATION RATE: 16 BRPM | OXYGEN SATURATION: 96 % | BODY MASS INDEX: 28.53 KG/M2 | SYSTOLIC BLOOD PRESSURE: 147 MMHG

## 2024-08-30 DIAGNOSIS — Z17.0 MALIGNANT NEOPLASM OF OVERLAPPING SITES OF RIGHT FEMALE BREAST: ICD-10-CM

## 2024-08-30 DIAGNOSIS — C50.811 MALIGNANT NEOPLASM OF OVERLAPPING SITES OF RIGHT FEMALE BREAST: ICD-10-CM

## 2024-08-30 PROCEDURE — 99204 OFFICE O/P NEW MOD 45 MIN: CPT

## 2024-08-30 NOTE — HISTORY OF PRESENT ILLNESS
[FreeTextEntry1] : Ms. 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 She presents today to discuss the role of radiation in her treatment.   Patient has history of non-Hodgkins's lymphoma in 1998 which was treated with chemotherapy (Dr Roberts) and mantle radiation therapy to the mediastinum (Dr Forrest Ashley,[facility]).   6/7/24 Screening mammogram ([place]) demonstrated a right breast mass. CT Chest showed no lymphadenopathy.   6/14/24 Diagnostic Mammogram & US ([facility]) showed a mammographic mass with no definite sonographic correlate identified at the right breast 6:00 axis. BI-RADS 4A.   6/21/24 Stereotactic core needle biopsy ([place]). Pathology revealed a 4mm right breast papillary carcinoma, intermediate nuclear grade, with microinvasion. ER/WA +   7/9/24 Bilateral breasts MRI ([place]) showed Right lower central posterior enhancing 1 cm mass with associated tissue marker corresponding to biopsied carcinoma. There is surrounding nonmass enhancement, some of which may be on the basis of post biopsy change, including linear nonmass enhancement extending up to approximately 1 cm anterior to the mass. In addition, a 1.2 cm area of linear nonmass enhancement is identified in the lower central mid depth breast (approximately 4 cm anterior to the site of biopsied carcinoma); recommend MRI guided biopsy to further assess. No MRI evidence of malignancy on the left. 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 There is focal ALH and columnar cell change/hyperplasia. ER/WA +   8/8/24 Right breast partial mastectomy and sentinel lymph node excision with Dr. Hernandez. Pathology revealed a microinvasive carcinome. DCIS is present in 3 separate foci in cribriform, micropapillary, and papillary patterns; intermediate grade. No LVI or LCIS. All margins negative, invasive carcinoma is 13mm closest to the anterior margin. DCIS is 5mm closest to the posterior margin. All lymph nodes negative (0/8). ER/WA+ HER2- Ki-67 not applicable.   8/30/24 Ms. Astorga presents today to discuss treatment with radiation. She is 3 weeks s/p right lumpectomy & SLNE with Dr. Hernandez. She does state that she is having some soreness and swelling at the surgery site. She had a seroma that was drained by Dr. Monzon. She has an appointment to see Dr. Sal next week,

## 2024-08-30 NOTE — HISTORY OF PRESENT ILLNESS
[FreeTextEntry1] : Ms. 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 She presents today to discuss the role of radiation in her treatment.   Patient has history of non-Hodgkins's lymphoma in 1998 which was treated with chemotherapy (Dr Roberts) and mantle radiation therapy to the mediastinum (Dr Forrest Ashley,[facility]).   6/7/24 Screening mammogram ([facility]) demonstrated a right breast mass. CT Chest showed no lymphadenopathy.   6/14/24 Diagnostic Mammogram & US ([facility]) showed a mammographic mass with no definite sonographic correlate identified at the right breast 6:00 axis. BI-RADS 4A.   6/21/24 Stereotactic core needle biopsy ([facility]). Pathology revealed a 4mm right breast papillary carcinoma, intermediate nuclear grade, with microinvasion. ER/DE +   7/9/24 Bilateral breasts MRI ([place]) showed Right lower central posterior enhancing 1 cm mass with associated tissue marker corresponding to biopsied carcinoma. There is surrounding nonmass enhancement, some of which may be on the basis of post biopsy change, including linear nonmass enhancement extending up to approximately 1 cm anterior to the mass. In addition, a 1.2 cm area of linear nonmass enhancement is identified in the lower central mid depth breast (approximately 4 cm anterior to the site of biopsied carcinoma); recommend MRI guided biopsy to further assess. No MRI evidence of malignancy on the left. 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 There is focal ALH and columnar cell change/hyperplasia. ER/DE +   8/8/24 Right breast partial mastectomy and sentinel lymph node excision with Dr. Hernandez. Pathology revealed a microinvasive carcinome. DCIS is present in 3 separate foci in cribriform, micropapillary, and papillary patterns; intermediate grade. No LVI or LCIS. All margins negative, invasive carcinoma is 13mm closest to the anterior margin. DCIS is 5mm closest to the posterior margin. All lymph nodes negative (0/8). ER/DE+ HER2- Ki-67 not applicable.   8/30/24 Ms. Astorga presents today to discuss treatment with radiation. She is 3 weeks s/p right lumpectomy & SLNE with Dr. Hernandez. She does state that she is having some soreness and swelling at the surgery site. She had a seroma that was drained by Dr. Monzon. She has an appointment to see Dr. Sal next week,

## 2024-08-30 NOTE — PHYSICAL EXAM
[Breast Appearance] : normal in appearance [Symmetric] : breasts are symmetric [Breast Palpation Mass] : no palpable masses [No UE Edema] : there is no upper extremity edema [Normal] : oriented to person, place and time, the affect was normal, the mood was normal and not anxious [de-identified] : Well-healing lumpectomy scar on right side with no underlying induration.  No lymph node palpable in right axilla

## 2024-08-30 NOTE — PHYSICAL EXAM
[Breast Appearance] : normal in appearance [Symmetric] : breasts are symmetric [Breast Palpation Mass] : no palpable masses [No UE Edema] : there is no upper extremity edema [Normal] : oriented to person, place and time, the affect was normal, the mood was normal and not anxious [de-identified] : Well-healing lumpectomy scar on right side with no underlying induration.  No lymph node palpable in right axilla

## 2024-09-09 ENCOUNTER — TRANSCRIPTION ENCOUNTER (OUTPATIENT)
Age: 69
End: 2024-09-09

## 2024-09-10 DIAGNOSIS — Z91.89 OTHER SPECIFIED PERSONAL RISK FACTORS, NOT ELSEWHERE CLASSIFIED: ICD-10-CM

## 2024-09-13 ENCOUNTER — APPOINTMENT (OUTPATIENT)
Dept: BREAST CENTER | Facility: CLINIC | Age: 69
End: 2024-09-13
Payer: MEDICARE

## 2024-09-13 VITALS — HEIGHT: 63 IN | WEIGHT: 161 LBS | BODY MASS INDEX: 28.53 KG/M2

## 2024-09-13 DIAGNOSIS — N61.0 MASTITIS WITHOUT ABSCESS: ICD-10-CM

## 2024-09-13 DIAGNOSIS — N64.89 OTHER SPECIFIED DISORDERS OF BREAST: ICD-10-CM

## 2024-09-13 DIAGNOSIS — Z17.0 MALIGNANT NEOPLASM OF OVERLAPPING SITES OF RIGHT FEMALE BREAST: ICD-10-CM

## 2024-09-13 DIAGNOSIS — C50.811 MALIGNANT NEOPLASM OF OVERLAPPING SITES OF RIGHT FEMALE BREAST: ICD-10-CM

## 2024-09-13 PROCEDURE — 10021 FNA BX W/O IMG GDN 1ST LES: CPT

## 2024-09-13 PROCEDURE — 99024 POSTOP FOLLOW-UP VISIT: CPT

## 2024-09-13 NOTE — PHYSICAL EXAM
[Symmetrical] : symmetrical [de-identified] : healing incisions, no collections in breast [de-identified] : right axillary seroma aspirated 80ml yellow colored fluid

## 2024-09-13 NOTE — PHYSICAL EXAM
[Symmetrical] : symmetrical [de-identified] : healing incisions, no collections in breast [de-identified] : right axillary seroma aspirated 80ml yellow colored fluid

## 2024-09-13 NOTE — ASSESSMENT
[FreeTextEntry1] : doing well path reviewed copy given rec med/onc cs and rad/onc cs plan right axillary sonogram and FNA next week f/u 1 month She knows to call or return sooner should any concerns or questions arise.

## 2024-09-13 NOTE — HISTORY OF PRESENT ILLNESS
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 Her screening mammogram (2024, [facility]) showed scattered fibroglandular tissue and a 1.1 cm lobulated mass in the right breast at 6:00. A right diagnostic mammogram and targeted ultrasound recommended and done on 2024 ([place]ay). Right diagnostic confirmed a right 6:00 posterior lobulated mass. Ultrasound findings did not show a definitive sonographic mass correlating to mammo findings, therefore right stereotactic biopsy was recommended and done on 2024. Pathology of right 6:00 (cork-shaped clip) showed papillary carcinoma with microinvasion, intermediate nuclear grade, ER strongly positive in > 95%, NM strongly positive in >95%.   Berenice's previous history is positive for non-Hodgkins's lymphoma in  which was treated with chemotherapy (Dr Roberts) and mantle radiation therapy to the mediastinum (Dr Forrest Ashley,[facility]) . She is now followed by Dr Sal. Her family history is positive for breast carcinoma in 2 sisters at age 40 and 48.   She had MRI which bx was recommended right 1.2 cm area of linear nonmass enhancement is identified in the lower central mid depth breast, approx 4cm anterior to the site of bx carcinoma- MRI bx recc. 2024 MRI bx right lower central breast (hour glass marker) pathology confirmed DCIS, intermediate grade, ER/NM+.   She is now s/p right breast partial mastectomy 2024- final pathology confirmed right LIQ DCIS with microinvasion 1.7mm, 18mm and 0.45mm of microinvasion ER>95% strong positive, NM >95% strong and HEr2 negative and ki67 too few tumor cells 0/8SLN, right breast medial had 4mm single focus on random section, not at edges of what was sampled, ER/NM positive ?95% positive; skin with dermal lymphocytic infiltrate. Left breast superior had cLCIS <1mm focus. She will start RT planning next week. She met with Dr. Sal and will start endocrine therapy pending DEXA and heme work up.  She does SBE. She has not noticed a change in her breast or a breast lump. She has not noticed a change in her nipple or nipple area. She has not noticed a change in the skin of the breast. She is not experiencing nipple discharge. She is not experiencing breast pain. She has not noticed a lump or lymph node under the armpit.   BREAST CANCER RISK FACTORS Menarche: 15 Date of LMP:1998 Menopause: 43 (chemo induced) Grav:  3    Para: 2 Age at first live birth: 30 Nursed: yes Hysterectomy: no Oophorectomy: no OCP: yes 2-3y HRT: no Last pap/pelvic exam:  WNL Related family history: Sister BCA @ 40, sister BCA@48 Ashkenazi: no Mastery risk assessment: n/a Genetic testin BRCANext negative Bra size: 42D   Last mammogram:    2024 Right diagnostic (Bilat 24)       Location: [place] Report reviewed.                                 Images reviewed. Results: Birads 4A Scattered fibroglandular densities. Right 6:00 posterior lobulated mass. Rec: Right stereotactic biopsy.   Last ultrasound: 2024 Right targeted                                  Location: Malay Report reviewed.                                 Images reviewed. Results: Birads 4A  Right 6:00 N4 0.8 x 0.5 x 0.4 cm hypoechoic structure felt to possible correlate with mammography findings but structure difficult to replicate on real time scanning and was possible felt to be fat lobule. Rec: right stereotactic biopsy   Last MRI: 2024    right biopsy                                        Location: Malay Report reviewed.                                   Images Reviewed. Results: BIRADS 4A A. RIGHT BREAST LOWER CENTRAL HOUR GLASS (CLIP MARKER), MRI BIOPSY: DUCTAL CARCINOMA IN SITU (DCIS), INTERMEDIATE NUCLEAR GRADE, PAPILLARY AND CRIBRIFORM TYPES WITH MICROCALCIFICATIONS, SPANNING 4 MM IN GREATEST DIAMETER AND INVOLVING TWO OF MULTIPLE BIOPSY CORES BIOMARKER RESULTS FOR DCIS (BLOCK A2): ESTROGEN RECEPTOR (ER): POSITIVE (>95%, STRONG INTENSITY) PROGESTERONE RECEPTOR (NM): POSITIVE (>95%, STRONG INTENSITY) ATYPICAL LOBULAR HYPERPLASIA (ALH), FOCAL COLUMNAR CELL CHANGE/HYPERPLASIA

## 2024-09-13 NOTE — HISTORY OF PRESENT ILLNESS
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 Her screening mammogram (20[facility], [facility]) showed scattered fibroglandular tissue and a 1.1 cm lobulated mass in the right breast at 6:00. A right diagnostic mammogram and targeted ultrasound recommended and done on 2024 ([place]ic). Right diagnostic confirmed a right 6:00 posterior lobulated mass. Ultrasound findings did not show a definitive sonographic mass correlating to mammo findings, therefore right stereotactic biopsy was recommended and done on 2024. Pathology of right 6:00 (cork-shaped clip) showed papillary carcinoma with microinvasion, intermediate nuclear grade, ER strongly positive in > 95%, WI strongly positive in >95%.   Berenice's previous history is positive for non-Hodgkins's lymphoma in  which was treated with chemotherapy (Dr Roberts) and mantle radiation therapy to the mediastinum (Dr Forrest Ashley,[facility]) . She is now followed by Dr Sal. Her family history is positive for breast carcinoma in 2 sisters at age 40 and 48.   She had MRI which bx was recommended right 1.2 cm area of linear nonmass enhancement is identified in the lower central mid depth breast, approx 4cm anterior to the site of bx carcinoma- MRI bx recc. 2024 MRI bx right lower central breast (hour glass marker) pathology confirmed DCIS, intermediate grade, ER/WI+.   She is now s/p right breast partial mastectomy 2024- final pathology confirmed right LIQ DCIS with microinvasion 1.7mm, 18mm and 0.45mm of microinvasion ER>95% strong positive, WI >95% strong and HEr2 negative and ki67 too few tumor cells 0/8SLN, right breast medial had 4mm single focus on random section, not at edges of what was sampled, ER/WI positive ?95% positive; skin with dermal lymphocytic infiltrate. Left breast superior had cLCIS <1mm focus. She will start RT planning next week. She met with Dr. Sal and will start endocrine therapy pending DEXA and heme work up.  She does SBE. She has not noticed a change in her breast or a breast lump. She has not noticed a change in her nipple or nipple area. She has not noticed a change in the skin of the breast. She is not experiencing nipple discharge. She is not experiencing breast pain. She has not noticed a lump or lymph node under the armpit.   BREAST CANCER RISK FACTORS Menarche: 15 Date of LMP:1998 Menopause: 43 (chemo induced) Grav:  3    Para: 2 Age at first live birth: 30 Nursed: yes Hysterectomy: no Oophorectomy: no OCP: yes 2-3y HRT: no Last pap/pelvic exam:  WNL Related family history: Sister BCA @ 40, sister BCA@48 Ashkenazi: no Mastery risk assessment: n/a Genetic testin BRCANext negative Bra size: 42D   Last mammogram:    2024 Right diagnostic (Bilat 24)       Location: Arabic Report reviewed.                                 Images reviewed. Results: Birads 4A Scattered fibroglandular densities. Right 6:00 posterior lobulated mass. Rec: Right stereotactic biopsy.   Last ultrasound: 2024 Right targeted                                  Location: Arabic Report reviewed.                                 Images reviewed. Results: Birads 4A  Right 6:00 N4 0.8 x 0.5 x 0.4 cm hypoechoic structure felt to possible correlate with mammography findings but structure difficult to replicate on real time scanning and was possible felt to be fat lobule. Rec: right stereotactic biopsy   Last MRI: 2024    right biopsy                                        Location: Arabic Report reviewed.                                   Images Reviewed. Results: BIRADS 4A A. RIGHT BREAST LOWER CENTRAL HOUR GLASS (CLIP MARKER), MRI BIOPSY: DUCTAL CARCINOMA IN SITU (DCIS), INTERMEDIATE NUCLEAR GRADE, PAPILLARY AND CRIBRIFORM TYPES WITH MICROCALCIFICATIONS, SPANNING 4 MM IN GREATEST DIAMETER AND INVOLVING TWO OF MULTIPLE BIOPSY CORES BIOMARKER RESULTS FOR DCIS (BLOCK A2): ESTROGEN RECEPTOR (ER): POSITIVE (>95%, STRONG INTENSITY) PROGESTERONE RECEPTOR (WI): POSITIVE (>95%, STRONG INTENSITY) ATYPICAL LOBULAR HYPERPLASIA (ALH), FOCAL COLUMNAR CELL CHANGE/HYPERPLASIA

## 2024-09-17 PROBLEM — N61.0 CELLULITIS OF BREAST: Status: ACTIVE | Noted: 2024-09-17

## 2024-09-17 RX ORDER — CEPHALEXIN 500 MG/1
500 CAPSULE ORAL 3 TIMES DAILY
Qty: 21 | Refills: 1 | Status: ACTIVE | COMMUNITY
Start: 2024-09-17 | End: 1900-01-01

## 2024-09-19 ENCOUNTER — RESULT REVIEW (OUTPATIENT)
Age: 69
End: 2024-09-19

## 2024-09-23 ENCOUNTER — NON-APPOINTMENT (OUTPATIENT)
Age: 69
End: 2024-09-23

## 2024-09-27 ENCOUNTER — APPOINTMENT (OUTPATIENT)
Dept: BREAST CENTER | Facility: CLINIC | Age: 69
End: 2024-09-27
Payer: MEDICARE

## 2024-09-27 DIAGNOSIS — C50.811 MALIGNANT NEOPLASM OF OVERLAPPING SITES OF RIGHT FEMALE BREAST: ICD-10-CM

## 2024-09-27 DIAGNOSIS — Z91.89 OTHER SPECIFIED PERSONAL RISK FACTORS, NOT ELSEWHERE CLASSIFIED: ICD-10-CM

## 2024-09-27 DIAGNOSIS — Z80.3 FAMILY HISTORY OF MALIGNANT NEOPLASM OF BREAST: ICD-10-CM

## 2024-09-27 DIAGNOSIS — Z17.0 MALIGNANT NEOPLASM OF OVERLAPPING SITES OF RIGHT FEMALE BREAST: ICD-10-CM

## 2024-09-27 DIAGNOSIS — D05.02 LOBULAR CARCINOMA IN SITU OF LEFT BREAST: ICD-10-CM

## 2024-09-27 DIAGNOSIS — Z92.21 PERSONAL HISTORY OF ANTINEOPLASTIC CHEMOTHERAPY: ICD-10-CM

## 2024-09-27 DIAGNOSIS — Z92.3 PERSONAL HISTORY OF IRRADIATION: ICD-10-CM

## 2024-09-27 PROCEDURE — 99024 POSTOP FOLLOW-UP VISIT: CPT

## 2024-09-27 PROCEDURE — 10021 FNA BX W/O IMG GDN 1ST LES: CPT

## 2024-09-27 NOTE — HISTORY OF PRESENT ILLNESS
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 Her screening mammogram (2024, [facility]) showed scattered fibroglandular tissue and a 1.1 cm lobulated mass in the right breast at 6:00. A right diagnostic mammogram and targeted ultrasound recommended and done on 2024 ([place]). Right diagnostic confirmed a right 6:00 posterior lobulated mass. Ultrasound findings did not show a definitive sonographic mass correlating to mammo findings, right stereotactic biopsy was recommended and done on 2024. Pathology of right 6:00 (cork-shaped clip) showed papillary carcinoma with microinvasion, intermediate nuclear grade, ER strongly positive in > 95%, KS strongly positive in >95%.   Berenice's previous history is positive for non-Hodgkins's lymphoma in  which was treated with chemotherapy (Dr Roberts) and mantle radiation therapy to the mediastinum (Dr Forrest Ashley,[facility]) . She is now followed by Dr Sal. Her family history is positive for breast carcinoma in 2 sisters at age 40 and 48.   She had MRI which bx was recommended right 1.2 cm area of linear nonmass enhancement is identified in the lower central mid depth breast, approx 4cm anterior to the site of bx carcinoma- MRI bx recc. 2024 MRI bx right lower central breast (hour glass marker) pathology confirmed DCIS, intermediate grade, ER/KS+.   She is s/p right breast partial mastectomy/SLNE 2024- final pathology confirmed right LIQ DCIS with microinvasion 1.7mm, 18mm and 0.45mm of microinvasion ER>95% strong positive, KS >95% strong and HEr2 negative and ki67 too few tumor cells 0/8SLN, right breast medial had 4mm single focus on random section, not at edges of what was sampled, ER/KS positive ?95% positive; skin with dermal lymphocytic infiltrate. Left breast superior had LCIS <1mm focus.   She established care with Dr. Kumar and when being set up for simulation she was noted to have a possible area of cellulitis and was given cephalexin with improvement. She was scheduled for an axilla FNA 2024 however it was canceled since there was no fluid collection identified in axilla.   She met with Dr. Sal and will start endocrine therapy pending DEXA and heme work up.  She does SBE. She has not noticed a change in her breast or a breast lump. She has not noticed a change in her nipple or nipple area. She has not noticed a change in the skin of the breast. She is not experiencing nipple discharge. She is not experiencing breast pain. She has not noticed a lump or lymph node under the armpit.   BREAST CANCER RISK FACTORS Menarche: 15 Date of LMP:1998 Menopause: 43 (chemo induced) Grav:  3    Para: 2 Age at first live birth: 30 Nursed: yes Hysterectomy: no Oophorectomy: no OCP: yes 2-3y HRT: no Last pap/pelvic exam:  WNL Related family history: Sister BCA @ 40, sister BCA@48 Ashkenazi: no Mastery risk assessment: n/a Genetic testin BRCANext negative Bra size: 42D   Last mammogram:    2024 Right diagnostic (Bilat 24)       Location: [place] Report reviewed.                                 Images reviewed. Results: Birads 4A Scattered fibroglandular densities. Right 6:00 posterior lobulated mass. Rec: Right stereotactic biopsy.   Last ultrasound: 2024 Right targeted                                  Location: [place] Report reviewed.                                 Images reviewed. Results: Birads 4A  Right 6:00 N4 0.8 x 0.5 x 0.4 cm hypoechoic structure felt to possible correlate with mammography findings but structure difficult to replicate on real time scanning and was possible felt to be fat lobule. Rec: right stereotactic biopsy   Last MRI: 2024    right biopsy                                        Location: [place] Report reviewed.                                   Images Reviewed. Results: BIRADS 4A A. RIGHT BREAST LOWER CENTRAL HOUR GLASS (CLIP MARKER), MRI BIOPSY: DUCTAL CARCINOMA IN SITU (DCIS), INTERMEDIATE NUCLEAR GRADE, PAPILLARY AND CRIBRIFORM TYPES WITH MICROCALCIFICATIONS, SPANNING 4 MM IN GREATEST DIAMETER AND INVOLVING TWO OF MULTIPLE BIOPSY CORES BIOMARKER RESULTS FOR DCIS (BLOCK A2): ESTROGEN RECEPTOR (ER): POSITIVE (>95%, STRONG INTENSITY) PROGESTERONE RECEPTOR (KS): POSITIVE (>95%, STRONG INTENSITY) ATYPICAL LOBULAR HYPERPLASIA (ALH), FOCAL COLUMNAR CELL CHANGE/HYPERPLASIA

## 2024-09-27 NOTE — PHYSICAL EXAM
[Symmetrical] : symmetrical [de-identified] : healing incisions, no collections in breast [de-identified] : axilla 10ml aspirated serous output [de-identified] : right axillary seroma aspirated 80ml yellow colored fluid

## 2024-09-27 NOTE — CONSULT LETTER
[Dear  ___] : Dear  [unfilled], [Courtesy Letter:] : I had the pleasure of seeing your patient, [unfilled], in my office today. [Please see my note below.] : Please see my note below. [Consult Closing:] : Thank you very much for allowing me to participate in the care of this patient.  If you have any questions, please do not hesitate to contact me. [Sincerely,] : Sincerely, [DrTroy  ___] : Dr. SWAN [FreeTextEntry3] : Malinda Sarmiento [place] [address]

## 2024-09-27 NOTE — CONSULT LETTER
[Dear  ___] : Dear  [unfilled], [Courtesy Letter:] : I had the pleasure of seeing your patient, [unfilled], in my office today. [Please see my note below.] : Please see my note below. [Consult Closing:] : Thank you very much for allowing me to participate in the care of this patient.  If you have any questions, please do not hesitate to contact me. [Sincerely,] : Sincerely, [DrTroy  ___] : Dr. SWAN [FreeTextEntry3] : Malinda [place] [address]

## 2024-09-27 NOTE — PHYSICAL EXAM
[Symmetrical] : symmetrical [de-identified] : healing incisions, no collections in breast [de-identified] : axilla 10ml aspirated serous output [de-identified] : right axillary seroma aspirated 80ml yellow colored fluid

## 2024-09-27 NOTE — ASSESSMENT
[FreeTextEntry1] : doing well path reviewed copy given plan rtx next week f/u 1 month She knows to call or return sooner should any concerns or questions arise.

## 2024-09-27 NOTE — HISTORY OF PRESENT ILLNESS
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 Her screening mammogram (2024, [facility]) showed scattered fibroglandular tissue and a 1.1 cm lobulated mass in the right breast at 6:00. A right diagnostic mammogram and targeted ultrasound recommended and done on 2024 ([place]). Right diagnostic confirmed a right 6:00 posterior lobulated mass. Ultrasound findings did not show a definitive sonographic mass correlating to mammo findings, right stereotactic biopsy was recommended and done on 2024. Pathology of right 6:00 (cork-shaped clip) showed papillary carcinoma with microinvasion, intermediate nuclear grade, ER strongly positive in > 95%, AR strongly positive in >95%.   Berenice's previous history is positive for non-Hodgkins's lymphoma in  which was treated with chemotherapy (Dr Roberts) and mantle radiation therapy to the mediastinum (Dr Forrest Ashley,[facility]) . She is now followed by Dr Sal. Her family history is positive for breast carcinoma in 2 sisters at age 40 and 48.   She had MRI which bx was recommended right 1.2 cm area of linear nonmass enhancement is identified in the lower central mid depth breast, approx 4cm anterior to the site of bx carcinoma- MRI bx recc. 2024 MRI bx right lower central breast (hour glass marker) pathology confirmed DCIS, intermediate grade, ER/AR+.   She is s/p right breast partial mastectomy/SLNE 2024- final pathology confirmed right LIQ DCIS with microinvasion 1.7mm, 18mm and 0.45mm of microinvasion ER>95% strong positive, AR >95% strong and HEr2 negative and ki67 too few tumor cells 0/8SLN, right breast medial had 4mm single focus on random section, not at edges of what was sampled, ER/AR positive ?95% positive; skin with dermal lymphocytic infiltrate. Left breast superior had LCIS <1mm focus.   She established care with Dr. Kumar and when being set up for simulation she was noted to have a possible area of cellulitis and was given cephalexin with improvement. She was scheduled for an axilla FNA 2024 however it was canceled since there was no fluid collection identified in axilla.   She met with Dr. Sal and will start endocrine therapy pending DEXA and heme work up.  She does SBE. She has not noticed a change in her breast or a breast lump. She has not noticed a change in her nipple or nipple area. She has not noticed a change in the skin of the breast. She is not experiencing nipple discharge. She is not experiencing breast pain. She has not noticed a lump or lymph node under the armpit.   BREAST CANCER RISK FACTORS Menarche: 15 Date of LMP:1998 Menopause: 43 (chemo induced) Grav:  3    Para: 2 Age at first live birth: 30 Nursed: yes Hysterectomy: no Oophorectomy: no OCP: yes 2-3y HRT: no Last pap/pelvic exam:  WNL Related family history: Sister BCA @ 40, sister BCA@48 Ashkenazi: no Mastery risk assessment: n/a Genetic testin BRCANext negative Bra size: 42D   Last mammogram:    2024 Right diagnostic (Bilat 24)       Location: [place] Report reviewed.                                 Images reviewed. Results: Birads 4A Scattered fibroglandular densities. Right 6:00 posterior lobulated mass. Rec: Right stereotactic biopsy.   Last ultrasound: 2024 Right targeted                                  Location: [place] Report reviewed.                                 Images reviewed. Results: Birads 4A  Right 6:00 N4 0.8 x 0.5 x 0.4 cm hypoechoic structure felt to possible correlate with mammography findings but structure difficult to replicate on real time scanning and was possible felt to be fat lobule. Rec: right stereotactic biopsy   Last MRI: 2024    right biopsy                                        Location: [place] Report reviewed.                                   Images Reviewed. Results: BIRADS 4A A. RIGHT BREAST LOWER CENTRAL HOUR GLASS (CLIP MARKER), MRI BIOPSY: DUCTAL CARCINOMA IN SITU (DCIS), INTERMEDIATE NUCLEAR GRADE, PAPILLARY AND CRIBRIFORM TYPES WITH MICROCALCIFICATIONS, SPANNING 4 MM IN GREATEST DIAMETER AND INVOLVING TWO OF MULTIPLE BIOPSY CORES BIOMARKER RESULTS FOR DCIS (BLOCK A2): ESTROGEN RECEPTOR (ER): POSITIVE (>95%, STRONG INTENSITY) PROGESTERONE RECEPTOR (AR): POSITIVE (>95%, STRONG INTENSITY) ATYPICAL LOBULAR HYPERPLASIA (ALH), FOCAL COLUMNAR CELL CHANGE/HYPERPLASIA

## 2024-09-27 NOTE — CONSULT LETTER
[Dear  ___] : Dear  [unfilled], [Courtesy Letter:] : I had the pleasure of seeing your patient, [unfilled], in my office today. [Please see my note below.] : Please see my note below. [Consult Closing:] : Thank you very much for allowing me to participate in the care of this patient.  If you have any questions, please do not hesitate to contact me. [Sincerely,] : Sincerely, [DrTroy  ___] : Dr. SWAN [FreeTextEntry3] : Malinda [place] [address]

## 2024-09-27 NOTE — PHYSICAL EXAM
[Symmetrical] : symmetrical [de-identified] : healing incisions, no collections in breast [de-identified] : axilla 10ml aspirated serous output [de-identified] : right axillary seroma aspirated 80ml yellow colored fluid

## 2024-09-27 NOTE — HISTORY OF PRESENT ILLNESS
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 Her screening mammogram (2024, [facility]) showed scattered fibroglandular tissue and a 1.1 cm lobulated mass in the right breast at 6:00. A right diagnostic mammogram and targeted ultrasound recommended and done on 2024 ([place]). Right diagnostic confirmed a right 6:00 posterior lobulated mass. Ultrasound findings did not show a definitive sonographic mass correlating to mammo findings, right stereotactic biopsy was recommended and done on 2024. Pathology of right 6:00 (cork-shaped clip) showed papillary carcinoma with microinvasion, intermediate nuclear grade, ER strongly positive in > 95%, ID strongly positive in >95%.   Berenice's previous history is positive for non-Hodgkins's lymphoma in  which was treated with chemotherapy (Dr Roberts) and mantle radiation therapy to the mediastinum (Dr Forrest Ashley,[facility]) . She is now followed by Dr Sal. Her family history is positive for breast carcinoma in 2 sisters at age 40 and 48.   She had MRI which bx was recommended right 1.2 cm area of linear nonmass enhancement is identified in the lower central mid depth breast, approx 4cm anterior to the site of bx carcinoma- MRI bx recc. 2024 MRI bx right lower central breast (hour glass marker) pathology confirmed DCIS, intermediate grade, ER/ID+.   She is s/p right breast partial mastectomy/SLNE 2024- final pathology confirmed right LIQ DCIS with microinvasion 1.7mm, 18mm and 0.45mm of microinvasion ER>95% strong positive, ID >95% strong and HEr2 negative and ki67 too few tumor cells 0/8SLN, right breast medial had 4mm single focus on random section, not at edges of what was sampled, ER/ID positive ?95% positive; skin with dermal lymphocytic infiltrate. Left breast superior had LCIS <1mm focus.   She established care with Dr. Kumar and when being set up for simulation she was noted to have a possible area of cellulitis and was given cephalexin with improvement. She was scheduled for an axilla FNA 2024 however it was canceled since there was no fluid collection identified in axilla.   She met with Dr. Sal and will start endocrine therapy pending DEXA and heme work up.  She does SBE. She has not noticed a change in her breast or a breast lump. She has not noticed a change in her nipple or nipple area. She has not noticed a change in the skin of the breast. She is not experiencing nipple discharge. She is not experiencing breast pain. She has not noticed a lump or lymph node under the armpit.   BREAST CANCER RISK FACTORS Menarche: 15 Date of LMP:1998 Menopause: 43 (chemo induced) Grav:  3    Para: 2 Age at first live birth: 30 Nursed: yes Hysterectomy: no Oophorectomy: no OCP: yes 2-3y HRT: no Last pap/pelvic exam:  WNL Related family history: Sister BCA @ 40, sister BCA@48 Ashkenazi: no Mastery risk assessment: n/a Genetic testin BRCANext negative Bra size: 42D   Last mammogram:    2024 Right diagnostic (Bilat 24)       Location: [place] Report reviewed.                                 Images reviewed. Results: Birads 4A Scattered fibroglandular densities. Right 6:00 posterior lobulated mass. Rec: Right stereotactic biopsy.   Last ultrasound: 2024 Right targeted                                  Location: [place] Report reviewed.                                 Images reviewed. Results: Birads 4A  Right 6:00 N4 0.8 x 0.5 x 0.4 cm hypoechoic structure felt to possible correlate with mammography findings but structure difficult to replicate on real time scanning and was possible felt to be fat lobule. Rec: right stereotactic biopsy   Last MRI: 2024    right biopsy                                        Location: [place] Report reviewed.                                   Images Reviewed. Results: BIRADS 4A A. RIGHT BREAST LOWER CENTRAL HOUR GLASS (CLIP MARKER), MRI BIOPSY: DUCTAL CARCINOMA IN SITU (DCIS), INTERMEDIATE NUCLEAR GRADE, PAPILLARY AND CRIBRIFORM TYPES WITH MICROCALCIFICATIONS, SPANNING 4 MM IN GREATEST DIAMETER AND INVOLVING TWO OF MULTIPLE BIOPSY CORES BIOMARKER RESULTS FOR DCIS (BLOCK A2): ESTROGEN RECEPTOR (ER): POSITIVE (>95%, STRONG INTENSITY) PROGESTERONE RECEPTOR (ID): POSITIVE (>95%, STRONG INTENSITY) ATYPICAL LOBULAR HYPERPLASIA (ALH), FOCAL COLUMNAR CELL CHANGE/HYPERPLASIA

## 2024-09-27 NOTE — HISTORY OF PRESENT ILLNESS
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 Her screening mammogram (2024, [facility]) showed scattered fibroglandular tissue and a 1.1 cm lobulated mass in the right breast at 6:00. A right diagnostic mammogram and targeted ultrasound recommended and done on 2024 ([place]ian). Right diagnostic confirmed a right 6:00 posterior lobulated mass. Ultrasound findings did not show a definitive sonographic mass correlating to mammo findings, right stereotactic biopsy was recommended and done on 2024. Pathology of right 6:00 (cork-shaped clip) showed papillary carcinoma with microinvasion, intermediate nuclear grade, ER strongly positive in > 95%, WI strongly positive in >95%.   Berenice's previous history is positive for non-Hodgkins's lymphoma in  which was treated with chemotherapy (Dr Roberts) and mantle radiation therapy to the mediastinum (Dr Forrest Ashley,[facility]) . She is now followed by Dr Sal. Her family history is positive for breast carcinoma in 2 sisters at age 40 and 48.   She had MRI which bx was recommended right 1.2 cm area of linear nonmass enhancement is identified in the lower central mid depth breast, approx 4cm anterior to the site of bx carcinoma- MRI bx recc. 2024 MRI bx right lower central breast (hour glass marker) pathology confirmed DCIS, intermediate grade, ER/WI+.   She is s/p right breast partial mastectomy/SLNE 2024- final pathology confirmed right LIQ DCIS with microinvasion 1.7mm, 18mm and 0.45mm of microinvasion ER>95% strong positive, WI >95% strong and HEr2 negative and ki67 too few tumor cells 0/8SLN, right breast medial had 4mm single focus on random section, not at edges of what was sampled, ER/WI positive ?95% positive; skin with dermal lymphocytic infiltrate. Left breast superior had LCIS <1mm focus.   She established care with Dr. Kumar and when being set up for simulation she was noted to have a possible area of cellulitis and was given cephalexin with improvement. She was scheduled for an axilla FNA 2024 however it was canceled since there was no fluid collection identified in axilla.   She met with Dr. Sal and will start endocrine therapy pending DEXA and heme work up.  She does SBE. She has not noticed a change in her breast or a breast lump. She has not noticed a change in her nipple or nipple area. She has not noticed a change in the skin of the breast. She is not experiencing nipple discharge. She is not experiencing breast pain. She has not noticed a lump or lymph node under the armpit.   BREAST CANCER RISK FACTORS Menarche: 15 Date of LMP:1998 Menopause: 43 (chemo induced) Grav:  3    Para: 2 Age at first live birth: 30 Nursed: yes Hysterectomy: no Oophorectomy: no OCP: yes 2-3y HRT: no Last pap/pelvic exam:  WNL Related family history: Sister BCA @ 40, sister BCA@48 Ashkenazi: no Mastery risk assessment: n/a Genetic testin BRCANext negative Bra size: 42D   Last mammogram:    2024 Right diagnostic (Bilat 24)       Location: [place] Report reviewed.                                 Images reviewed. Results: Birads 4A Scattered fibroglandular densities. Right 6:00 posterior lobulated mass. Rec: Right stereotactic biopsy.   Last ultrasound: 2024 Right targeted                                  Location: [place] Report reviewed.                                 Images reviewed. Results: Birads 4A  Right 6:00 N4 0.8 x 0.5 x 0.4 cm hypoechoic structure felt to possible correlate with mammography findings but structure difficult to replicate on real time scanning and was possible felt to be fat lobule. Rec: right stereotactic biopsy   Last MRI: 2024    right biopsy                                        Location: [place] Report reviewed.                                   Images Reviewed. Results: BIRADS 4A A. RIGHT BREAST LOWER CENTRAL HOUR GLASS (CLIP MARKER), MRI BIOPSY: DUCTAL CARCINOMA IN SITU (DCIS), INTERMEDIATE NUCLEAR GRADE, PAPILLARY AND CRIBRIFORM TYPES WITH MICROCALCIFICATIONS, SPANNING 4 MM IN GREATEST DIAMETER AND INVOLVING TWO OF MULTIPLE BIOPSY CORES BIOMARKER RESULTS FOR DCIS (BLOCK A2): ESTROGEN RECEPTOR (ER): POSITIVE (>95%, STRONG INTENSITY) PROGESTERONE RECEPTOR (WI): POSITIVE (>95%, STRONG INTENSITY) ATYPICAL LOBULAR HYPERPLASIA (ALH), FOCAL COLUMNAR CELL CHANGE/HYPERPLASIA

## 2024-09-27 NOTE — CONSULT LETTER
[Dear  ___] : Dear  [unfilled], [Courtesy Letter:] : I had the pleasure of seeing your patient, [unfilled], in my office today. [Please see my note below.] : Please see my note below. [Consult Closing:] : Thank you very much for allowing me to participate in the care of this patient.  If you have any questions, please do not hesitate to contact me. [Sincerely,] : Sincerely, [DrTroy  ___] : Dr. SWAN [FreeTextEntry3] : Malinda Sarmiento [place] [address]

## 2024-09-27 NOTE — PHYSICAL EXAM
[Symmetrical] : symmetrical [de-identified] : healing incisions, no collections in breast [de-identified] : axilla 10ml aspirated serous output [de-identified] : right axillary seroma aspirated 80ml yellow colored fluid

## 2024-10-01 ENCOUNTER — NON-APPOINTMENT (OUTPATIENT)
Age: 69
End: 2024-10-01

## 2024-10-09 ENCOUNTER — NON-APPOINTMENT (OUTPATIENT)
Age: 69
End: 2024-10-09

## 2024-10-09 VITALS
HEART RATE: 80 BPM | WEIGHT: 163 LBS | RESPIRATION RATE: 16 BRPM | TEMPERATURE: 98 F | BODY MASS INDEX: 28.87 KG/M2 | OXYGEN SATURATION: 98 % | SYSTOLIC BLOOD PRESSURE: 114 MMHG | DIASTOLIC BLOOD PRESSURE: 58 MMHG

## 2024-10-09 DIAGNOSIS — C50.811 MALIGNANT NEOPLASM OF OVERLAPPING SITES OF RIGHT FEMALE BREAST: ICD-10-CM

## 2024-10-09 DIAGNOSIS — Z17.0 MALIGNANT NEOPLASM OF OVERLAPPING SITES OF RIGHT FEMALE BREAST: ICD-10-CM

## 2024-10-16 ENCOUNTER — NON-APPOINTMENT (OUTPATIENT)
Age: 69
End: 2024-10-16

## 2024-10-16 VITALS
BODY MASS INDEX: 29.06 KG/M2 | WEIGHT: 164 LBS | SYSTOLIC BLOOD PRESSURE: 133 MMHG | OXYGEN SATURATION: 100 % | HEART RATE: 73 BPM | DIASTOLIC BLOOD PRESSURE: 60 MMHG | RESPIRATION RATE: 16 BRPM | HEIGHT: 63 IN

## 2024-10-16 DIAGNOSIS — Z17.0 MALIGNANT NEOPLASM OF OVERLAPPING SITES OF RIGHT FEMALE BREAST: ICD-10-CM

## 2024-10-16 DIAGNOSIS — C50.811 MALIGNANT NEOPLASM OF OVERLAPPING SITES OF RIGHT FEMALE BREAST: ICD-10-CM

## 2024-10-23 ENCOUNTER — NON-APPOINTMENT (OUTPATIENT)
Age: 69
End: 2024-10-23

## 2024-10-23 VITALS
RESPIRATION RATE: 6 BRPM | HEART RATE: 70 BPM | HEIGHT: 63 IN | WEIGHT: 166 LBS | OXYGEN SATURATION: 98 % | BODY MASS INDEX: 29.41 KG/M2 | SYSTOLIC BLOOD PRESSURE: 126 MMHG | DIASTOLIC BLOOD PRESSURE: 66 MMHG

## 2024-10-29 ENCOUNTER — NON-APPOINTMENT (OUTPATIENT)
Age: 69
End: 2024-10-29

## 2024-10-29 VITALS
RESPIRATION RATE: 16 BRPM | TEMPERATURE: 98 F | SYSTOLIC BLOOD PRESSURE: 126 MMHG | HEART RATE: 85 BPM | BODY MASS INDEX: 30.11 KG/M2 | WEIGHT: 170 LBS | OXYGEN SATURATION: 98 % | DIASTOLIC BLOOD PRESSURE: 71 MMHG

## 2024-11-14 ENCOUNTER — APPOINTMENT (OUTPATIENT)
Dept: BREAST CENTER | Facility: CLINIC | Age: 69
End: 2024-11-14

## 2024-11-14 ENCOUNTER — APPOINTMENT (OUTPATIENT)
Dept: RADIATION ONCOLOGY | Facility: CLINIC | Age: 69
End: 2024-11-14

## 2024-11-14 VITALS
DIASTOLIC BLOOD PRESSURE: 78 MMHG | RESPIRATION RATE: 16 BRPM | SYSTOLIC BLOOD PRESSURE: 122 MMHG | OXYGEN SATURATION: 96 % | HEART RATE: 89 BPM | TEMPERATURE: 98 F

## 2024-11-14 DIAGNOSIS — C50.811 MALIGNANT NEOPLASM OF OVERLAPPING SITES OF RIGHT FEMALE BREAST: ICD-10-CM

## 2024-11-14 DIAGNOSIS — Z17.0 MALIGNANT NEOPLASM OF OVERLAPPING SITES OF RIGHT FEMALE BREAST: ICD-10-CM

## 2024-11-14 PROCEDURE — 99215 OFFICE O/P EST HI 40 MIN: CPT | Mod: 25

## 2024-11-14 PROCEDURE — 10060 I&D ABSCESS SIMPLE/SINGLE: CPT

## 2024-11-14 PROCEDURE — 99024 POSTOP FOLLOW-UP VISIT: CPT

## 2024-11-14 RX ORDER — CEPHALEXIN 500 MG/1
500 CAPSULE ORAL 3 TIMES DAILY
Qty: 21 | Refills: 1 | Status: ACTIVE | COMMUNITY
Start: 2024-11-14 | End: 1900-01-01

## 2024-11-14 RX ORDER — ANASTROZOLE TABLETS 1 MG/1
TABLET ORAL
Refills: 0 | Status: ACTIVE | COMMUNITY

## 2024-11-15 ENCOUNTER — APPOINTMENT (OUTPATIENT)
Dept: INTERNAL MEDICINE | Facility: CLINIC | Age: 69
End: 2024-11-15

## 2024-11-19 ENCOUNTER — RESULT REVIEW (OUTPATIENT)
Age: 69
End: 2024-11-19

## 2024-11-19 ENCOUNTER — APPOINTMENT (OUTPATIENT)
Dept: BREAST CENTER | Facility: CLINIC | Age: 69
End: 2024-11-19
Payer: MEDICARE

## 2024-11-19 VITALS
HEIGHT: 63 IN | DIASTOLIC BLOOD PRESSURE: 77 MMHG | BODY MASS INDEX: 30.12 KG/M2 | SYSTOLIC BLOOD PRESSURE: 134 MMHG | HEART RATE: 89 BPM | WEIGHT: 170 LBS

## 2024-11-19 DIAGNOSIS — Z17.0 MALIGNANT NEOPLASM OF OVERLAPPING SITES OF RIGHT FEMALE BREAST: ICD-10-CM

## 2024-11-19 DIAGNOSIS — C50.811 MALIGNANT NEOPLASM OF OVERLAPPING SITES OF RIGHT FEMALE BREAST: ICD-10-CM

## 2024-11-19 DIAGNOSIS — L02.419 CUTANEOUS ABSCESS OF LIMB, UNSPECIFIED: ICD-10-CM

## 2024-11-19 LAB — CULTURE, WOUND AEROBE ANAEROBE: ABNORMAL

## 2024-11-19 PROCEDURE — 99213 OFFICE O/P EST LOW 20 MIN: CPT

## 2024-11-25 ENCOUNTER — APPOINTMENT (OUTPATIENT)
Dept: RADIATION ONCOLOGY | Facility: CLINIC | Age: 69
End: 2024-11-25

## 2024-11-25 VITALS
TEMPERATURE: 97.8 F | RESPIRATION RATE: 18 BRPM | HEART RATE: 85 BPM | OXYGEN SATURATION: 98 % | DIASTOLIC BLOOD PRESSURE: 77 MMHG | SYSTOLIC BLOOD PRESSURE: 145 MMHG

## 2024-11-25 DIAGNOSIS — C50.811 MALIGNANT NEOPLASM OF OVERLAPPING SITES OF RIGHT FEMALE BREAST: ICD-10-CM

## 2024-11-25 DIAGNOSIS — Z17.0 MALIGNANT NEOPLASM OF OVERLAPPING SITES OF RIGHT FEMALE BREAST: ICD-10-CM

## 2024-11-25 PROCEDURE — 99024 POSTOP FOLLOW-UP VISIT: CPT

## 2024-12-18 ENCOUNTER — APPOINTMENT (OUTPATIENT)
Dept: BREAST CENTER | Facility: CLINIC | Age: 69
End: 2024-12-18

## 2025-01-14 ENCOUNTER — APPOINTMENT (OUTPATIENT)
Dept: BREAST CENTER | Facility: CLINIC | Age: 70
End: 2025-01-14
Payer: MEDICARE

## 2025-01-14 VITALS
BODY MASS INDEX: 30.12 KG/M2 | WEIGHT: 170 LBS | SYSTOLIC BLOOD PRESSURE: 135 MMHG | DIASTOLIC BLOOD PRESSURE: 70 MMHG | HEIGHT: 63 IN | HEART RATE: 80 BPM

## 2025-01-14 DIAGNOSIS — Z17.0 MALIGNANT NEOPLASM OF OVERLAPPING SITES OF RIGHT FEMALE BREAST: ICD-10-CM

## 2025-01-14 DIAGNOSIS — C50.811 MALIGNANT NEOPLASM OF OVERLAPPING SITES OF RIGHT FEMALE BREAST: ICD-10-CM

## 2025-01-14 DIAGNOSIS — D05.02 LOBULAR CARCINOMA IN SITU OF LEFT BREAST: ICD-10-CM

## 2025-01-14 DIAGNOSIS — Z91.89 OTHER SPECIFIED PERSONAL RISK FACTORS, NOT ELSEWHERE CLASSIFIED: ICD-10-CM

## 2025-01-14 PROCEDURE — 99213 OFFICE O/P EST LOW 20 MIN: CPT

## 2025-05-26 ENCOUNTER — NON-APPOINTMENT (OUTPATIENT)
Age: 70
End: 2025-05-26

## 2025-05-27 ENCOUNTER — APPOINTMENT (OUTPATIENT)
Dept: RADIATION ONCOLOGY | Facility: CLINIC | Age: 70
End: 2025-05-27
Payer: MEDICARE

## 2025-05-27 VITALS
HEART RATE: 77 BPM | SYSTOLIC BLOOD PRESSURE: 132 MMHG | OXYGEN SATURATION: 96 % | DIASTOLIC BLOOD PRESSURE: 66 MMHG | RESPIRATION RATE: 16 BRPM

## 2025-05-27 DIAGNOSIS — Z17.0 MALIGNANT NEOPLASM OF OVERLAPPING SITES OF RIGHT FEMALE BREAST: ICD-10-CM

## 2025-05-27 DIAGNOSIS — C50.811 MALIGNANT NEOPLASM OF OVERLAPPING SITES OF RIGHT FEMALE BREAST: ICD-10-CM

## 2025-05-27 PROCEDURE — 99212 OFFICE O/P EST SF 10 MIN: CPT

## 2025-06-12 ENCOUNTER — NON-APPOINTMENT (OUTPATIENT)
Age: 70
End: 2025-06-12

## 2025-08-06 ENCOUNTER — APPOINTMENT (OUTPATIENT)
Dept: INTERNAL MEDICINE | Facility: CLINIC | Age: 70
End: 2025-08-06
Payer: MEDICARE

## 2025-08-06 ENCOUNTER — RESULT REVIEW (OUTPATIENT)
Age: 70
End: 2025-08-06

## 2025-08-06 VITALS
BODY MASS INDEX: 27.82 KG/M2 | WEIGHT: 157 LBS | SYSTOLIC BLOOD PRESSURE: 142 MMHG | TEMPERATURE: 97.7 F | HEIGHT: 63 IN | OXYGEN SATURATION: 99 % | HEART RATE: 71 BPM | DIASTOLIC BLOOD PRESSURE: 80 MMHG | RESPIRATION RATE: 16 BRPM

## 2025-08-06 DIAGNOSIS — E78.5 HYPERLIPIDEMIA, UNSPECIFIED: ICD-10-CM

## 2025-08-06 DIAGNOSIS — R73.03 PREDIABETES.: ICD-10-CM

## 2025-08-06 DIAGNOSIS — M85.859 OTHER SPECIFIED DISORDERS OF BONE DENSITY AND STRUCTURE, UNSPECIFIED THIGH: ICD-10-CM

## 2025-08-06 DIAGNOSIS — E55.9 VITAMIN D DEFICIENCY, UNSPECIFIED: ICD-10-CM

## 2025-08-06 PROCEDURE — G2211 COMPLEX E/M VISIT ADD ON: CPT

## 2025-08-06 PROCEDURE — 36415 COLL VENOUS BLD VENIPUNCTURE: CPT

## 2025-08-06 PROCEDURE — 99213 OFFICE O/P EST LOW 20 MIN: CPT

## 2025-08-07 ENCOUNTER — NON-APPOINTMENT (OUTPATIENT)
Age: 70
End: 2025-08-07

## 2025-08-07 ENCOUNTER — RESULT REVIEW (OUTPATIENT)
Age: 70
End: 2025-08-07

## 2025-08-08 ENCOUNTER — TRANSCRIPTION ENCOUNTER (OUTPATIENT)
Age: 70
End: 2025-08-08

## 2025-08-08 LAB
25(OH)D3 SERPL-MCNC: 38.6 NG/ML
CHOLEST SERPL-MCNC: 169 MG/DL
ESTIMATED AVERAGE GLUCOSE: 126 MG/DL
HBA1C MFR BLD HPLC: 6 %
HDLC SERPL-MCNC: 58 MG/DL
LDLC SERPL-MCNC: 96 MG/DL
NONHDLC SERPL-MCNC: 111 MG/DL
TRIGL SERPL-MCNC: 76 MG/DL

## 2025-08-11 DIAGNOSIS — Z12.31 ENCOUNTER FOR SCREENING MAMMOGRAM FOR MALIGNANT NEOPLASM OF BREAST: ICD-10-CM

## 2025-08-12 ENCOUNTER — APPOINTMENT (OUTPATIENT)
Dept: BREAST CENTER | Facility: CLINIC | Age: 70
End: 2025-08-12

## 2025-08-12 VITALS
BODY MASS INDEX: 27.46 KG/M2 | HEIGHT: 63 IN | SYSTOLIC BLOOD PRESSURE: 132 MMHG | DIASTOLIC BLOOD PRESSURE: 76 MMHG | WEIGHT: 155 LBS

## 2025-08-12 DIAGNOSIS — Z17.0 MALIGNANT NEOPLASM OF OVERLAPPING SITES OF RIGHT FEMALE BREAST: ICD-10-CM

## 2025-08-12 DIAGNOSIS — Z92.21 PERSONAL HISTORY OF ANTINEOPLASTIC CHEMOTHERAPY: ICD-10-CM

## 2025-08-12 DIAGNOSIS — Z92.3 PERSONAL HISTORY OF IRRADIATION: ICD-10-CM

## 2025-08-12 DIAGNOSIS — C50.811 MALIGNANT NEOPLASM OF OVERLAPPING SITES OF RIGHT FEMALE BREAST: ICD-10-CM

## 2025-08-12 DIAGNOSIS — Z80.3 FAMILY HISTORY OF MALIGNANT NEOPLASM OF BREAST: ICD-10-CM

## 2025-08-12 DIAGNOSIS — Z91.89 OTHER SPECIFIED PERSONAL RISK FACTORS, NOT ELSEWHERE CLASSIFIED: ICD-10-CM

## 2025-08-12 DIAGNOSIS — D05.02 LOBULAR CARCINOMA IN SITU OF LEFT BREAST: ICD-10-CM

## 2025-08-12 PROCEDURE — 99213 OFFICE O/P EST LOW 20 MIN: CPT

## 2025-08-12 RX ORDER — ALPRAZOLAM 0.25 MG/1
0.25 TABLET ORAL
Qty: 3 | Refills: 0 | Status: DISCONTINUED | COMMUNITY
Start: 2025-08-04 | End: 2025-08-12

## 2025-08-18 ENCOUNTER — RESULT REVIEW (OUTPATIENT)
Age: 70
End: 2025-08-18